# Patient Record
Sex: FEMALE | Race: WHITE | NOT HISPANIC OR LATINO | Employment: UNEMPLOYED | URBAN - NONMETROPOLITAN AREA
[De-identification: names, ages, dates, MRNs, and addresses within clinical notes are randomized per-mention and may not be internally consistent; named-entity substitution may affect disease eponyms.]

---

## 2018-11-06 ENCOUNTER — PROCEDURE VISIT (OUTPATIENT)
Dept: OBSTETRICS AND GYNECOLOGY | Facility: CLINIC | Age: 23
End: 2018-11-06

## 2018-11-06 VITALS
DIASTOLIC BLOOD PRESSURE: 70 MMHG | WEIGHT: 139 LBS | BODY MASS INDEX: 27.29 KG/M2 | HEIGHT: 60 IN | SYSTOLIC BLOOD PRESSURE: 112 MMHG

## 2018-11-06 DIAGNOSIS — Z30.8 ENCOUNTER FOR OTHER CONTRACEPTIVE MANAGEMENT: Primary | ICD-10-CM

## 2018-11-06 PROCEDURE — 99203 OFFICE O/P NEW LOW 30 MIN: CPT | Performed by: OBSTETRICS & GYNECOLOGY

## 2018-11-06 RX ORDER — NORETHINDRONE ACETATE AND ETHINYL ESTRADIOL 1.5-30(21)
1 KIT ORAL DAILY
Qty: 28 TABLET | Refills: 12 | Status: SHIPPED | OUTPATIENT
Start: 2018-11-06 | End: 2019-03-15

## 2018-11-06 RX ORDER — NORETHINDRONE ACETATE AND ETHINYL ESTRADIOL 1.5-30(21)
1 KIT ORAL DAILY
COMMUNITY
End: 2018-11-06 | Stop reason: SDUPTHER

## 2018-11-06 RX ORDER — INFLUENZA A VIRUS A/MICHIGAN/45/2015 X-275 (H1N1) ANTIGEN (FORMALDEHYDE INACTIVATED), INFLUENZA A VIRUS A/SINGAPORE/INFIMH-16-0019/2016 IVR-186 (H3N2) ANTIGEN (FORMALDEHYDE INACTIVATED), INFLUENZA B VIRUS B/PHUKET/3073/2013 ANTIGEN (FORMALDEHYDE INACTIVATED), AND INFLUENZA B VIRUS B/MARYLAND/15/2016 BX-69A ANTIGEN (FORMALDEHYDE INACTIVATED) 15; 15; 15; 15 UG/.5ML; UG/.5ML; UG/.5ML; UG/.5ML
INJECTION, SUSPENSION INTRAMUSCULAR
Refills: 0 | COMMUNITY
Start: 2018-10-10 | End: 2019-01-12

## 2018-11-06 NOTE — PROGRESS NOTES
Subjective   Chief Complaint   Patient presents with   • Contraception     Renewal on Birthcontrol, is going to call her last doctor to make sure it has been a year before she has her pap     Sadia Whitney is a 23 y.o. year old .  Patient's last menstrual period was 10/16/2018.  She presents to be seen because of refill for OCP--pap done last year in [facility]--doing well with OCP.     OTHER COMPLAINTS:  Nothing else    The following portions of the patient's history were reviewed and updated as appropriate:  She  has a past medical history of Anxiety.  She  does not have a problem list on file.  She  has a past surgical history that includes [place] tooth extraction and Breast Reduction.  Her family history is not on file.  She  reports that she has never smoked. She has never used smokeless tobacco. She reports that she does not drink alcohol or use drugs.  Current Outpatient Prescriptions   Medication Sig Dispense Refill   • norethindrone-ethinyl estradiol-iron (MICROGESTIN FE 1.5/30) 1.5-30 MG-MCG tablet Take 1 tablet by mouth Daily.     • FLUZONE QUADRIVALENT 0.5 ML suspension prefilled syringe injection ADM 0.5ML IM UTD  0     No current facility-administered medications for this visit.      No current outpatient prescriptions on file prior to visit.     No current facility-administered medications on file prior to visit.      She has No Known Allergies.    Smoking status: Never Smoker                                                              Smokeless tobacco: Never Used                        Review of Systems  Consitutional POS: nothing reported    NEG: anorexia or night sweats   Gastointestinal POS: nothing reported    NEG: bloating, change in bowel habits, melena or reflux symptoms   Genitourinary POS: nothing reported    NEG: dysuria or hematuria   Integument POS: nothing reported    NEG: moles that are changing in size, shape, color or rashes   Breast POS: nothing reported    NEG: persistent  "breast lump, skin dimpling or nipple discharge         Eyes: negative  Ears, nose, mouth, throat, and face: negative  Respiratory: negative  GYN:  negative  Hematologic/lymphatic: negative  Musculoskeletal:negative  Neurological: negative  Behavioral/Psych: negative  Endocrine: negative  Allergic/Immunologic: negative          Objective   /70   Ht 152.4 cm (60\")   Wt 63 kg (139 lb)   LMP 10/16/2018   BMI 27.15 kg/m²     General:  well developed; well nourished  no acute distress   Skin:  No suspicious lesions seen   Thyroid: normal to inspection and palpation   Lungs:  breathing is unlabored   Heart:  Not performed.   Breasts:  Not performed.   Abdomen: soft, non-tender; no masses  no umbilical or inginual hernias are present  no hepato-splenomegaly   Pelvis: Not performed.     Psychiatric: Alert and oriented ×3, mood and affect appropriate  HEENT: Atraumatic, normocephalic, normal scleral icterus  Extremities: 2+ pulses bilaterally, no edema      Lab Review   No data reviewed    Imaging   No data reviewed        Assessment   1. Microgestin refilled     Plan   1. PAP report  2. Declines pap today    No orders of the defined types were placed in this encounter.         This note was electronically signed.      November 6, 2018      "

## 2019-03-15 ENCOUNTER — OFFICE VISIT (OUTPATIENT)
Dept: INTERNAL MEDICINE | Facility: CLINIC | Age: 24
End: 2019-03-15

## 2019-03-15 VITALS
TEMPERATURE: 98 F | SYSTOLIC BLOOD PRESSURE: 122 MMHG | HEIGHT: 60 IN | WEIGHT: 134.6 LBS | HEART RATE: 90 BPM | DIASTOLIC BLOOD PRESSURE: 80 MMHG | BODY MASS INDEX: 26.42 KG/M2 | OXYGEN SATURATION: 98 %

## 2019-03-15 DIAGNOSIS — F51.04 PSYCHOPHYSIOLOGICAL INSOMNIA: Primary | ICD-10-CM

## 2019-03-15 PROCEDURE — 99203 OFFICE O/P NEW LOW 30 MIN: CPT | Performed by: FAMILY MEDICINE

## 2019-03-15 RX ORDER — TRAZODONE HYDROCHLORIDE 50 MG/1
50 TABLET ORAL NIGHTLY
Qty: 30 TABLET | Refills: 2 | Status: SHIPPED | OUTPATIENT
Start: 2019-03-15 | End: 2019-04-14 | Stop reason: SDUPTHER

## 2019-03-15 NOTE — PROGRESS NOTES
Sadia Whitney is a 23 y.o. female.    Chief Complaint   Patient presents with   • Establish Care   • Insomnia       HPI   Patient presents today to establish care.  Patient reports depression and anxiety. She report night sweats and insomnia.  She has noticed some improvement since being on Zoloft.  Zoloft was prescribed by a provider at student services at [facility].  Denies any nervousness or worry.  She has tried melatonin, tylenol pm, she tried some of her mother's ambien with good response.  She get about 6 hours of sleep.  She has trouble getting to sleep and not much trouble staying asleep.      The following portions of the patient's history were reviewed and updated as appropriate: allergies, current medications, past family history, past medical history, past social history, past surgical history and problem list.     Past Medical History:   Diagnosis Date   • Anxiety    • Depression        Past Surgical History:   Procedure Laterality Date   • BILATERAL BREAST REDUCTION     • WISDOM TOOTH EXTRACTION         Family History   Problem Relation Age of Onset   • No Known Problems Mother    • No Known Problems Father    • Cancer Maternal Grandfather        Social History     Socioeconomic History   • Marital status: Single     Spouse name: Not on file   • Number of children: Not on file   • Years of education: Not on file   • Highest education level: Not on file   Social Needs   • Financial resource strain: Not on file   • Food insecurity - worry: Not on file   • Food insecurity - inability: Not on file   • Transportation needs - medical: Not on file   • Transportation needs - non-medical: Not on file   Occupational History   • Not on file   Tobacco Use   • Smoking status: Never Smoker   • Smokeless tobacco: Never Used   Substance and Sexual Activity   • Alcohol use: Yes     Frequency: 2-4 times a month     Comment: hard cider   • Drug use: No   • Sexual activity: Yes     Partners: Female     Birth  "control/protection: OCP   Other Topics Concern   • Not on file   Social History Narrative   • Not on file       No Known Allergies      Current Outpatient Medications:   •  Norethin Ace-Eth Estrad-FE (BLISOVI 24 FE PO), Take  by mouth., Disp: , Rfl:   •  sertraline (ZOLOFT) 50 MG tablet, Take 50 mg by mouth Daily., Disp: , Rfl:   •  traZODone (DESYREL) 50 MG tablet, Take 1 tablet by mouth Every Night., Disp: 30 tablet, Rfl: 2    ROS    Review of Systems   Constitutional: Negative for chills, fatigue and fever.   HENT: Negative for congestion, postnasal drip and sore throat.    Eyes: Negative for blurred vision and visual disturbance.   Respiratory: Negative for cough, shortness of breath and wheezing.    Cardiovascular: Negative for chest pain and leg swelling.   Gastrointestinal: Negative for abdominal pain, constipation, diarrhea, nausea and vomiting.   Endocrine: Negative for cold intolerance and heat intolerance.   Genitourinary: Negative for dysuria and frequency.   Musculoskeletal: Negative for arthralgias and back pain.   Skin: Negative for color change and rash.   Allergic/Immunologic: Negative for environmental allergies.   Neurological: Negative for weakness, numbness and headache.   Hematological: Does not bruise/bleed easily.   Psychiatric/Behavioral: Positive for sleep disturbance and depressed mood. The patient is nervous/anxious.        Vitals:    03/15/19 0905   BP: 122/80   BP Location: Left arm   Patient Position: Sitting   Cuff Size: Adult   Pulse: 90   Temp: 98 °F (36.7 °C)   TempSrc: Oral   SpO2: 98%   Weight: 61.1 kg (134 lb 9.6 oz)   Height: 152.4 cm (60\")     Body mass index is 26.29 kg/m².    Physical Exam     Physical Exam   Constitutional: She is oriented to person, place, and time. She appears well-developed and well-nourished. No distress.   HENT:   Head: Normocephalic and atraumatic.   Right Ear: Tympanic membrane and external ear normal.   Left Ear: Tympanic membrane and external ear " normal.   Mouth/Throat: Oropharynx is clear and moist.   Eyes: Conjunctivae and EOM are normal. Pupils are equal, round, and reactive to light.   Neck: Normal range of motion. Neck supple.   Cardiovascular: Normal rate and regular rhythm.   No murmur heard.  Pulmonary/Chest: Effort normal and breath sounds normal. No respiratory distress. She has no wheezes.   Abdominal: Soft. Bowel sounds are normal. She exhibits no distension. There is no tenderness.   Musculoskeletal: Normal range of motion. She exhibits no edema.   Lymphadenopathy:     She has no cervical adenopathy.   Neurological: She is alert and oriented to person, place, and time. She displays normal reflexes. No cranial nerve deficit.   Skin: Skin is warm and dry.   Psychiatric: She has a normal mood and affect. Her behavior is normal.       Assessment/Plan    Problem List Items Addressed This Visit        Other    Psychophysiological insomnia - Primary     Will start the patient on trazodone.  Discussed with the patient that I would like to avoid using Ambien.  If trazodone is not successful, may start the patient on amitriptyline.  Side effects of medications have been discussed with the patient.               New Medications Ordered This Visit   Medications   • traZODone (DESYREL) 50 MG tablet     Sig: Take 1 tablet by mouth Every Night.     Dispense:  30 tablet     Refill:  2       No orders of the defined types were placed in this encounter.      Return in about 4 weeks (around 4/12/2019) for insomnia.      Charlotte Antunez DO

## 2019-03-19 PROBLEM — F51.04 PSYCHOPHYSIOLOGICAL INSOMNIA: Status: ACTIVE | Noted: 2019-03-19

## 2019-03-19 NOTE — ASSESSMENT & PLAN NOTE
Will start the patient on trazodone.  Discussed with the patient that I would like to avoid using Ambien.  If trazodone is not successful, may start the patient on amitriptyline.  Side effects of medications have been discussed with the patient.

## 2019-04-12 ENCOUNTER — OFFICE VISIT (OUTPATIENT)
Dept: INTERNAL MEDICINE | Facility: CLINIC | Age: 24
End: 2019-04-12

## 2019-04-12 VITALS
TEMPERATURE: 98.4 F | HEART RATE: 89 BPM | WEIGHT: 140 LBS | DIASTOLIC BLOOD PRESSURE: 70 MMHG | SYSTOLIC BLOOD PRESSURE: 114 MMHG | BODY MASS INDEX: 27.48 KG/M2 | OXYGEN SATURATION: 98 % | HEIGHT: 60 IN

## 2019-04-12 DIAGNOSIS — F33.0 MILD EPISODE OF RECURRENT MAJOR DEPRESSIVE DISORDER (HCC): ICD-10-CM

## 2019-04-12 DIAGNOSIS — L20.9 ATOPIC DERMATITIS, UNSPECIFIED TYPE: Primary | ICD-10-CM

## 2019-04-12 DIAGNOSIS — F51.04 PSYCHOPHYSIOLOGICAL INSOMNIA: ICD-10-CM

## 2019-04-12 PROCEDURE — 99214 OFFICE O/P EST MOD 30 MIN: CPT | Performed by: FAMILY MEDICINE

## 2019-04-12 NOTE — PROGRESS NOTES
Sadia Whitney is a 23 y.o. female.    Chief Complaint   Patient presents with   • Insomnia     Pt states this has gotten better   • Follow-up       HPI   Patient presents to f/u on insomnia.  She is sleeping well on trazodone.  She is able to get to sleep and stay asleep for 7-8 hours without any problem.  She does have depression as well and is being treated with zoloft.   She denies any feelings of hopelessness and worthlessness.  Denies any crying spells or SI ideations.  She does need a refill on the medication.      Patient also reports ezcema.  Her extremities are affected. She has a red dry rash to the affected areas.  She has been using cetaphil without much improvement here lately.     The following portions of the patient's history were reviewed and updated as appropriate: allergies, current medications, past family history, past medical history, past social history, past surgical history and problem list.     No Known Allergies      Current Outpatient Medications:   •  Norethin Ace-Eth Estrad-FE (BLISOVI 24 FE PO), Take  by mouth., Disp: , Rfl:   •  sertraline (ZOLOFT) 50 MG tablet, Take 1 tablet by mouth Daily., Disp: 30 tablet, Rfl: 5  •  Crisaborole (EUCRISA) 2 % ointment, Apply 1 each topically 2 (Two) Times a Day., Disp: 60 g, Rfl: 2  •  traZODone (DESYREL) 50 MG tablet, Take 1 tablet by mouth Every Night., Disp: 30 tablet, Rfl: 2    ROS    Review of Systems   Constitutional: Negative for chills, fatigue and fever.   HENT: Negative for congestion, postnasal drip and sore throat.    Respiratory: Negative for cough, shortness of breath and wheezing.    Cardiovascular: Negative for chest pain and leg swelling.   Gastrointestinal: Negative for abdominal pain, constipation, diarrhea, nausea and vomiting.   Musculoskeletal: Negative for arthralgias and back pain.   Skin: Positive for dry skin and rash. Negative for color change.   Neurological: Negative for weakness and headache.  "  Psychiatric/Behavioral: Negative for sleep disturbance and depressed mood. The patient is not nervous/anxious.        Vitals:    04/12/19 1305   BP: 114/70   BP Location: Left arm   Patient Position: Sitting   Cuff Size: Adult   Pulse: 89   Temp: 98.4 °F (36.9 °C)   TempSrc: Oral   SpO2: 98%   Weight: 63.5 kg (140 lb)   Height: 152.4 cm (60\")     Body mass index is 27.34 kg/m².    Physical Exam     Physical Exam   Constitutional: She is oriented to person, place, and time. She appears well-developed and well-nourished. No distress.   HENT:   Head: Normocephalic and atraumatic.   Right Ear: External ear normal.   Left Ear: External ear normal.   Mouth/Throat: Oropharynx is clear and moist.   Eyes: Conjunctivae and EOM are normal.   Neck: Normal range of motion. No JVD present.   Cardiovascular: Normal rate and regular rhythm.   No murmur heard.  Pulmonary/Chest: Effort normal and breath sounds normal. No respiratory distress. She has no wheezes.   Abdominal: Soft. Bowel sounds are normal. She exhibits no distension. There is no tenderness.   Neurological: She is alert and oriented to person, place, and time. No cranial nerve deficit.   Skin: Skin is warm and dry. Rash (dry erythematous rash to upper extremities) noted.   Psychiatric: She has a normal mood and affect. Her behavior is normal.       Assessment/Plan    Problem List Items Addressed This Visit        Musculoskeletal and Integument    Atopic dermatitis - Primary     Uncontrolled with cetaphil lotion.  Given samples of eucrisa in the office today.          Relevant Medications    Crisaborole (EUCRISA) 2 % ointment       Other    Psychophysiological insomnia     Controlled with trazodone.  Continue medication.         Mild episode of recurrent major depressive disorder (CMS/HCC)     Improved with zoloft.  Continue medication.          Relevant Medications    sertraline (ZOLOFT) 50 MG tablet          New Medications Ordered This Visit   Medications   • " sertraline (ZOLOFT) 50 MG tablet     Sig: Take 1 tablet by mouth Daily.     Dispense:  30 tablet     Refill:  5   • Crisaborole (EUCRISA) 2 % ointment     Sig: Apply 1 each topically 2 (Two) Times a Day.     Dispense:  60 g     Refill:  2       No orders of the defined types were placed in this encounter.      Return in about 3 months (around 7/12/2019) for depression, eczema, insomnia.    Charlotte Antunez, DO

## 2019-04-15 PROBLEM — L20.9 ATOPIC DERMATITIS: Status: ACTIVE | Noted: 2019-04-15

## 2019-04-15 PROBLEM — F33.0 MILD EPISODE OF RECURRENT MAJOR DEPRESSIVE DISORDER: Status: ACTIVE | Noted: 2019-04-15

## 2019-04-15 RX ORDER — TRAZODONE HYDROCHLORIDE 50 MG/1
50 TABLET ORAL NIGHTLY
Qty: 30 TABLET | Refills: 2 | Status: SHIPPED | OUTPATIENT
Start: 2019-04-15 | End: 2019-07-12 | Stop reason: SDUPTHER

## 2019-07-12 ENCOUNTER — OFFICE VISIT (OUTPATIENT)
Dept: INTERNAL MEDICINE | Facility: CLINIC | Age: 24
End: 2019-07-12

## 2019-07-12 VITALS
HEIGHT: 60 IN | DIASTOLIC BLOOD PRESSURE: 72 MMHG | SYSTOLIC BLOOD PRESSURE: 110 MMHG | BODY MASS INDEX: 29.45 KG/M2 | WEIGHT: 150 LBS | OXYGEN SATURATION: 98 % | TEMPERATURE: 97.6 F | HEART RATE: 96 BPM

## 2019-07-12 DIAGNOSIS — L20.9 ATOPIC DERMATITIS, UNSPECIFIED TYPE: ICD-10-CM

## 2019-07-12 DIAGNOSIS — F51.04 PSYCHOPHYSIOLOGICAL INSOMNIA: Primary | ICD-10-CM

## 2019-07-12 DIAGNOSIS — F33.0 MILD EPISODE OF RECURRENT MAJOR DEPRESSIVE DISORDER (HCC): ICD-10-CM

## 2019-07-12 PROCEDURE — 99214 OFFICE O/P EST MOD 30 MIN: CPT | Performed by: FAMILY MEDICINE

## 2019-07-12 RX ORDER — SERTRALINE HYDROCHLORIDE 100 MG/1
100 TABLET, FILM COATED ORAL DAILY
Qty: 30 TABLET | Refills: 5 | Status: SHIPPED | OUTPATIENT
Start: 2019-07-12 | End: 2020-01-31

## 2019-07-12 RX ORDER — TRAZODONE HYDROCHLORIDE 50 MG/1
50 TABLET ORAL NIGHTLY
Qty: 30 TABLET | Refills: 5 | Status: SHIPPED | OUTPATIENT
Start: 2019-07-12 | End: 2019-10-18

## 2019-07-12 NOTE — ASSESSMENT & PLAN NOTE
Uncontrolled.  Will increase Zoloft to 100 mg daily.  Patient has been advised to continue oral contraceptive as well to help with PMS symptoms.

## 2019-07-12 NOTE — PROGRESS NOTES
Sadia Whitney is a 24 y.o. female.    Chief Complaint   Patient presents with   • Depression   • Insomnia   • Eczema       HPI   Patient presents to f/u for depression. She reports around her menses she gets more depression.  She does report crying spells.  She does report suicidal thoughts off and on with menses.  Denies any nervousness.  She has been taking Zoloft with good response most days.    Patient does have insomnia as well.  She reports this is well controlled.  She is able to get to sleep and stay asleep with trazodone.      Patient has eczema to upper extremities.  She has been prescribed Eucrisa.  Eucrisa does help with eczema to decrease the redness and dryness.  She states that she tends to get the rash more on her hands.  She does report that Eucrisa has been as effective as topical steroids.    The following portions of the patient's history were reviewed and updated as appropriate: allergies, current medications, past family history, past medical history, past social history, past surgical history and problem list.     No Known Allergies      Current Outpatient Medications:   •  Crisaborole (EUCRISA) 2 % ointment, Apply 1 each topically 2 (Two) Times a Day., Disp: 60 g, Rfl: 2  •  Norethin Ace-Eth Estrad-FE (BLISOVI 24 FE PO), Take  by mouth., Disp: , Rfl:   •  sertraline (ZOLOFT) 100 MG tablet, Take 1 tablet by mouth Daily., Disp: 30 tablet, Rfl: 5  •  traZODone (DESYREL) 50 MG tablet, Take 1 tablet by mouth Every Night., Disp: 30 tablet, Rfl: 5    ROS    Review of Systems   Constitutional: Negative for chills, fatigue and fever.   HENT: Positive for congestion, postnasal drip and rhinorrhea.    Eyes: Positive for discharge and itching.   Respiratory: Positive for cough. Negative for shortness of breath and wheezing.    Cardiovascular: Negative for chest pain and leg swelling.   Gastrointestinal: Negative for abdominal pain, constipation, diarrhea, nausea and vomiting.   Musculoskeletal:  "Negative for arthralgias and back pain.   Allergic/Immunologic: Positive for environmental allergies.   Psychiatric/Behavioral: Positive for sleep disturbance and depressed mood. The patient is not nervous/anxious.        Vitals:    07/12/19 1309   BP: 110/72   BP Location: Left arm   Patient Position: Sitting   Cuff Size: Adult   Pulse: 96   Temp: 97.6 °F (36.4 °C)   TempSrc: Temporal   SpO2: 98%   Weight: 68 kg (150 lb)   Height: 152.4 cm (60\")     Body mass index is 29.29 kg/m².    Physical Exam     Physical Exam   Constitutional: She is oriented to person, place, and time. She appears well-developed and well-nourished. No distress.   HENT:   Head: Normocephalic and atraumatic.   Right Ear: External ear normal.   Left Ear: External ear normal.   Eyes: Conjunctivae and EOM are normal.   Cardiovascular: Normal rate and regular rhythm.   No murmur heard.  Pulmonary/Chest: Effort normal and breath sounds normal. No respiratory distress. She has no wheezes.   Abdominal: Soft. Bowel sounds are normal. She exhibits no distension. There is no tenderness.   Neurological: She is alert and oriented to person, place, and time. No cranial nerve deficit.   Skin: Skin is warm and dry. Rash (Dry, pink patches to upper extremities.) noted.   Psychiatric: She has a normal mood and affect. Her behavior is normal.       Assessment/Plan    Problem List Items Addressed This Visit        Musculoskeletal and Integument    Atopic dermatitis     Improved with Eucrisa.  Patient is to continue using this medication as needed.            Other    Psychophysiological insomnia - Primary     Controlled on current medication.  Patient is to continue trazodone.         Mild episode of recurrent major depressive disorder (CMS/HCC)     Uncontrolled.  Will increase Zoloft to 100 mg daily.  Patient has been advised to continue oral contraceptive as well to help with PMS symptoms.         Relevant Medications    traZODone (DESYREL) 50 MG tablet    " sertraline (ZOLOFT) 100 MG tablet          New Medications Ordered This Visit   Medications   • traZODone (DESYREL) 50 MG tablet     Sig: Take 1 tablet by mouth Every Night.     Dispense:  30 tablet     Refill:  5   • sertraline (ZOLOFT) 100 MG tablet     Sig: Take 1 tablet by mouth Daily.     Dispense:  30 tablet     Refill:  5       No orders of the defined types were placed in this encounter.      Return in about 3 months (around 10/12/2019) for depression, insomnia, allergies.    Charlotte Antunez, DO

## 2019-10-18 ENCOUNTER — OFFICE VISIT (OUTPATIENT)
Dept: INTERNAL MEDICINE | Facility: CLINIC | Age: 24
End: 2019-10-18

## 2019-10-18 VITALS
SYSTOLIC BLOOD PRESSURE: 128 MMHG | TEMPERATURE: 97.7 F | BODY MASS INDEX: 31.02 KG/M2 | DIASTOLIC BLOOD PRESSURE: 78 MMHG | OXYGEN SATURATION: 100 % | WEIGHT: 158 LBS | HEART RATE: 85 BPM | HEIGHT: 60 IN

## 2019-10-18 DIAGNOSIS — Z23 NEED FOR INFLUENZA VACCINATION: ICD-10-CM

## 2019-10-18 DIAGNOSIS — F51.04 PSYCHOPHYSIOLOGICAL INSOMNIA: ICD-10-CM

## 2019-10-18 DIAGNOSIS — F33.0 MILD EPISODE OF RECURRENT MAJOR DEPRESSIVE DISORDER (HCC): Primary | ICD-10-CM

## 2019-10-18 DIAGNOSIS — L20.9 ATOPIC DERMATITIS, UNSPECIFIED TYPE: ICD-10-CM

## 2019-10-18 PROCEDURE — 99214 OFFICE O/P EST MOD 30 MIN: CPT | Performed by: FAMILY MEDICINE

## 2019-10-18 PROCEDURE — 90471 IMMUNIZATION ADMIN: CPT | Performed by: FAMILY MEDICINE

## 2019-10-18 PROCEDURE — 90674 CCIIV4 VAC NO PRSV 0.5 ML IM: CPT | Performed by: FAMILY MEDICINE

## 2019-10-18 RX ORDER — AMITRIPTYLINE HYDROCHLORIDE 25 MG/1
25 TABLET, FILM COATED ORAL NIGHTLY
Qty: 30 TABLET | Refills: 2 | Status: SHIPPED | OUTPATIENT
Start: 2019-10-18 | End: 2020-03-02

## 2019-10-18 NOTE — PROGRESS NOTES
Sadia Whitney is a 24 y.o. female.    Chief Complaint   Patient presents with   • Follow-up     patient doing well with Zoloft       HPI   Patient is here to follow-up on depression and insomnia..  She is taking zoloft with goood response.  She denies any feelings of hopelessness or worthlessness.  She denies any feelings of nervousness or worry.  She denies any crying spells.  She does report trazodone causes weird vivid dreams.  She is interested in trying something different for insomnia.    Patient also has atopic dermatitis.  Is primarily located on her upper extremities that tends to be exacerbated by her workplace.  She is using Eucrisa with good response.  She reports the cream does help to alleviate the rash.    The following portions of the patient's history were reviewed and updated as appropriate: allergies, current medications, past family history, past medical history, past social history, past surgical history and problem list.     No Known Allergies      Current Outpatient Medications:   •  Crisaborole (EUCRISA) 2 % ointment, Apply 1 each topically 2 (Two) Times a Day., Disp: 60 g, Rfl: 2  •  Norethin Ace-Eth Estrad-FE (BLISOVI 24 FE PO), Take  by mouth., Disp: , Rfl:   •  sertraline (ZOLOFT) 100 MG tablet, Take 1 tablet by mouth Daily., Disp: 30 tablet, Rfl: 5  •  amitriptyline (ELAVIL) 25 MG tablet, Take 1 tablet by mouth Every Night., Disp: 30 tablet, Rfl: 2    ROS    Review of Systems   Constitutional: Negative for chills, fatigue and fever.   HENT: Negative for congestion, postnasal drip and sore throat.    Eyes: Positive for discharge. Negative for itching.   Respiratory: Negative for cough, shortness of breath and wheezing.    Cardiovascular: Negative for chest pain and leg swelling.   Gastrointestinal: Negative for abdominal pain, constipation, diarrhea, nausea and vomiting.   Musculoskeletal: Negative for arthralgias and back pain.   Allergic/Immunologic: Positive for environmental  "allergies.   Neurological: Negative for weakness and headache.   Psychiatric/Behavioral: Positive for sleep disturbance. Negative for depressed mood. The patient is not nervous/anxious.        Vitals:    10/18/19 1336   BP: 128/78   Pulse: 85   Temp: 97.7 °F (36.5 °C)   SpO2: 100%   Weight: 71.7 kg (158 lb)   Height: 152.4 cm (60\")     Body mass index is 30.86 kg/m².    Physical Exam     Physical Exam   Constitutional: She is oriented to person, place, and time. She appears well-developed and well-nourished. No distress.   HENT:   Head: Normocephalic and atraumatic.   Right Ear: External ear normal.   Left Ear: External ear normal.   Eyes: Conjunctivae and EOM are normal.   Cardiovascular: Normal rate and regular rhythm.   No murmur heard.  Pulmonary/Chest: Effort normal and breath sounds normal. No respiratory distress. She has no wheezes.   Abdominal: Soft. Bowel sounds are normal. She exhibits no distension. There is no tenderness.   Neurological: She is alert and oriented to person, place, and time. No cranial nerve deficit.   Skin: Skin is warm and dry.   Psychiatric: She has a normal mood and affect. Her behavior is normal.       Assessment/Plan    Problem List Items Addressed This Visit        Musculoskeletal and Integument    Atopic dermatitis     Improved with Eucrisa.  Patient is to continue this as needed.            Other    Psychophysiological insomnia     Controlled with trazodone, but has side effects of strange dreams.  Will discontinue trazodone and start amitriptyline.         Mild episode of recurrent major depressive disorder (CMS/HCC) - Primary     Controlled on current medication.  Patient is to continue Zoloft.         Relevant Medications    amitriptyline (ELAVIL) 25 MG tablet      Other Visit Diagnoses     Need for influenza vaccination        Relevant Orders    Flucelvax Quad=>4Years ([id number]) (Completed)          New Medications Ordered This Visit   Medications   • amitriptyline (ELAVIL) " 25 MG tablet     Sig: Take 1 tablet by mouth Every Night.     Dispense:  30 tablet     Refill:  2       Orders Placed This Encounter   Procedures   • Flucelvax Quad=>4Years ([id number])       Return in about 1 year (around 10/18/2020) for sleep.    Charlotte Antunez, DO

## 2019-10-26 NOTE — ASSESSMENT & PLAN NOTE
Controlled with trazodone, but has side effects of strange dreams.  Will discontinue trazodone and start amitriptyline.

## 2019-11-04 RX ORDER — NORETHINDRONE ACETATE AND ETHINYL ESTRADIOL 1.5-30(21)
KIT ORAL
Qty: 28 TABLET | Refills: 0 | Status: SHIPPED | OUTPATIENT
Start: 2019-11-04 | End: 2019-12-05 | Stop reason: SDUPTHER

## 2019-11-13 ENCOUNTER — OFFICE VISIT (OUTPATIENT)
Dept: INTERNAL MEDICINE | Facility: CLINIC | Age: 24
End: 2019-11-13

## 2019-11-13 VITALS
OXYGEN SATURATION: 97 % | HEIGHT: 60 IN | DIASTOLIC BLOOD PRESSURE: 70 MMHG | TEMPERATURE: 97.9 F | SYSTOLIC BLOOD PRESSURE: 114 MMHG | HEART RATE: 86 BPM | BODY MASS INDEX: 30.43 KG/M2 | WEIGHT: 155 LBS

## 2019-11-13 DIAGNOSIS — J45.20 MILD INTERMITTENT ASTHMA WITHOUT COMPLICATION: Primary | ICD-10-CM

## 2019-11-13 DIAGNOSIS — F51.04 PSYCHOPHYSIOLOGICAL INSOMNIA: ICD-10-CM

## 2019-11-13 PROCEDURE — 99213 OFFICE O/P EST LOW 20 MIN: CPT | Performed by: FAMILY MEDICINE

## 2019-11-13 NOTE — ASSESSMENT & PLAN NOTE
Stable at this time.  Patient may use albuterol as needed for exacerbation during cold temperature.

## 2019-11-26 RX ORDER — NORETHINDRONE ACETATE AND ETHINYL ESTRADIOL 1.5-30(21)
KIT ORAL
Qty: 28 TABLET | Refills: 0 | OUTPATIENT
Start: 2019-11-26

## 2019-12-05 RX ORDER — NORETHINDRONE ACETATE AND ETHINYL ESTRADIOL 1.5-30(21)
1 KIT ORAL DAILY
Qty: 28 TABLET | Refills: 0 | Status: SHIPPED | OUTPATIENT
Start: 2019-12-05 | End: 2019-12-11 | Stop reason: SDUPTHER

## 2019-12-11 ENCOUNTER — TELEPHONE (OUTPATIENT)
Dept: OBSTETRICS AND GYNECOLOGY | Facility: CLINIC | Age: 24
End: 2019-12-11

## 2019-12-11 RX ORDER — NORETHINDRONE ACETATE AND ETHINYL ESTRADIOL 1.5-30(21)
1 KIT ORAL DAILY
Qty: 28 TABLET | Refills: 0 | Status: SHIPPED | OUTPATIENT
Start: 2019-12-11 | End: 2020-01-08 | Stop reason: SDUPTHER

## 2019-12-11 NOTE — TELEPHONE ENCOUNTER
----- Message from Yesica Garcia sent at 12/11/2019  1:07 PM EST -----  Contact: patient  Patient called and scheduled her annual for next month, she is requesting one refill of her birth control be sent to [facility].

## 2019-12-16 RX ORDER — NORETHINDRONE ACETATE AND ETHINYL ESTRADIOL 1.5-30(21)
KIT ORAL
Qty: 28 TABLET | Refills: 0 | OUTPATIENT
Start: 2019-12-16

## 2020-01-08 ENCOUNTER — OFFICE VISIT (OUTPATIENT)
Dept: OBSTETRICS AND GYNECOLOGY | Facility: CLINIC | Age: 25
End: 2020-01-08

## 2020-01-08 VITALS
BODY MASS INDEX: 33.96 KG/M2 | SYSTOLIC BLOOD PRESSURE: 116 MMHG | DIASTOLIC BLOOD PRESSURE: 70 MMHG | HEIGHT: 60 IN | WEIGHT: 173 LBS

## 2020-01-08 DIAGNOSIS — Z01.419 ENCOUNTER FOR GYNECOLOGICAL EXAMINATION WITHOUT ABNORMAL FINDING: Primary | ICD-10-CM

## 2020-01-08 PROCEDURE — 99395 PREV VISIT EST AGE 18-39: CPT | Performed by: OBSTETRICS & GYNECOLOGY

## 2020-01-08 RX ORDER — NORETHINDRONE ACETATE AND ETHINYL ESTRADIOL 1.5-30(21)
1 KIT ORAL DAILY
Qty: 28 TABLET | Refills: 12 | Status: SHIPPED | OUTPATIENT
Start: 2020-01-08 | End: 2021-01-28

## 2020-01-08 NOTE — PROGRESS NOTES
Subjective   Chief Complaint   Patient presents with   • Gynecologic Exam     Last pap 2 years ago JOSHUA Espinoza.    • Med Refill     Needs yearly refill on OCP     Sadia Whitney is a 24 y.o. year old  presenting to be seen for her annual exam.     SEXUAL Hx:  She is currently sexually active.  In the past year there has not been new sexual partners.    Condoms are not typically used.  She would not like to be screened for STD's at today's exam.  Current birth control method: OCP (estrogen/progesterone).  MENSTRUAL Hx:  Patient's last menstrual period was 2020.  In the past 6 months her cycles have been regular, predictable and occur monthly.   Her menstrual flow is normal.   Each month on average there are roughly 0 days of very heavy flow.    Intermenstrual bleeding is absent.    Post-coital bleeding is absent.  Dysmenorrhea: is not affecting her activities of daily living  PMS: is not affecting her activities of daily living  Her cycles are not a source of concern for her that she wishes to discuss today.  HEALTH Hx:  She exercises regularly: no (and has no plans to become more active).  She wears her seat belt:yes.  She has concerns about domestic violence: no.  OTHER COMPLAINTS:  Nothing else    The following portions of the patient's history were reviewed and updated as appropriate:  She  has a past medical history of Anxiety and Depression.  She does not have any pertinent problems on file.  She  has a past surgical history that includes [place] tooth extraction and Breast Reduction.  Her family history includes Cancer in her maternal grandfather; No Known Problems in her father and mother.  She  reports that she has never smoked. She has never used smokeless tobacco. She reports that she drinks alcohol. She reports that she does not use drugs.  Current Outpatient Medications   Medication Sig Dispense Refill   • amitriptyline (ELAVIL) 25 MG tablet Take 1 tablet by mouth Every Night. 30 tablet  "2   • Crisaborole (EUCRISA) 2 % ointment Apply 1 each topically 2 (Two) Times a Day. 60 g 2   • norethindrone-ethinyl estradiol-iron (BLISOVI FE 1.5/30) 1.5-30 MG-MCG tablet Take 1 tablet by mouth Daily. 28 tablet 12   • sertraline (ZOLOFT) 100 MG tablet Take 1 tablet by mouth Daily. 30 tablet 5     No current facility-administered medications for this visit.      Current Outpatient Medications on File Prior to Visit   Medication Sig   • amitriptyline (ELAVIL) 25 MG tablet Take 1 tablet by mouth Every Night.   • Crisaborole (EUCRISA) 2 % ointment Apply 1 each topically 2 (Two) Times a Day.   • sertraline (ZOLOFT) 100 MG tablet Take 1 tablet by mouth Daily.   • [DISCONTINUED] norethindrone-ethinyl estradiol-iron (BLISOVI FE 1.5/30) 1.5-30 MG-MCG tablet Take 1 tablet by mouth Daily.     No current facility-administered medications on file prior to visit.      She has No Known Allergies..    Social History    Tobacco Use      Smoking status: Never Smoker      Smokeless tobacco: Never Used    Review of Systems  Consitutional NEG: anorexia or night sweats    POS: nothing reported   Gastointestinal NEG: bloating, change in bowel habits, melena or reflux symptoms    POS: nothing reported   Genitourinary NEG: dysuria or hematuria    POS: nothing reported   Integument NEG: moles that are changing in size, shape, color or rashes    POS: nothing reported   Breast NEG: persistent breast lump, skin dimpling or nipple discharge    POS: nothing reported          Objective   /70   Ht 152.4 cm (60\")   Wt 78.5 kg (173 lb)   LMP 01/02/2020   BMI 33.79 kg/m²     General:  well developed; well nourished  no acute distress   Skin:  No suspicious lesions seen   Thyroid: normal to inspection and palpation   Breasts:  Examined in supine position  Symmetric without masses or skin dimpling  Nipples normal without inversion, lesions or discharge  There are no palpable axillary nodes   Abdomen: soft, non-tender; no masses  no " umbilical or inguinal hernias are present  no hepato-splenomegaly   Pelvis: Clinical staff was present for exam  External genitalia:  normal appearance of the external genitalia including [place]'s and [place]'s glands.  :  urethral meatus normal;  Vaginal:  normal pink mucosa without prolapse or lesions.  Cervix:  normal appearance.  Uterus:  normal size, shape and consistency.  Adnexa:  normal bimanual exam of the adnexa.  Rectal:  digital rectal exam not performed; anus visually normal appearing.        Assessment   1. Normal PE     Plan   1. PAP done  2.   3. Follow up     New Medications Ordered This Visit   Medications   • norethindrone-ethinyl estradiol-iron (BLISOVI FE 1.5/30) 1.5-30 MG-MCG tablet     Sig: Take 1 tablet by mouth Daily.     Dispense:  28 tablet     Refill:  12          This note was electronically signed.      January 8, 2020

## 2020-01-15 DIAGNOSIS — Z01.419 ENCOUNTER FOR GYNECOLOGICAL EXAMINATION WITHOUT ABNORMAL FINDING: ICD-10-CM

## 2020-01-31 RX ORDER — SERTRALINE HYDROCHLORIDE 100 MG/1
TABLET, FILM COATED ORAL
Qty: 30 TABLET | Refills: 0 | Status: SHIPPED | OUTPATIENT
Start: 2020-01-31 | End: 2020-02-17 | Stop reason: SDUPTHER

## 2020-02-17 ENCOUNTER — OFFICE VISIT (OUTPATIENT)
Dept: INTERNAL MEDICINE | Facility: CLINIC | Age: 25
End: 2020-02-17

## 2020-02-17 VITALS
DIASTOLIC BLOOD PRESSURE: 80 MMHG | SYSTOLIC BLOOD PRESSURE: 122 MMHG | OXYGEN SATURATION: 98 % | WEIGHT: 180 LBS | HEIGHT: 60 IN | TEMPERATURE: 98.8 F | HEART RATE: 80 BPM | BODY MASS INDEX: 35.34 KG/M2

## 2020-02-17 DIAGNOSIS — G56.91 NEUROPATHY OF RIGHT UPPER EXTREMITY: ICD-10-CM

## 2020-02-17 DIAGNOSIS — M25.551 RIGHT HIP PAIN: ICD-10-CM

## 2020-02-17 DIAGNOSIS — F51.04 PSYCHOPHYSIOLOGICAL INSOMNIA: ICD-10-CM

## 2020-02-17 DIAGNOSIS — F33.0 MILD EPISODE OF RECURRENT MAJOR DEPRESSIVE DISORDER (HCC): Primary | ICD-10-CM

## 2020-02-17 PROBLEM — G56.21 NEUROPATHY OF RIGHT ULNAR NERVE AT WRIST: Status: ACTIVE | Noted: 2020-02-17

## 2020-02-17 PROCEDURE — 99214 OFFICE O/P EST MOD 30 MIN: CPT | Performed by: FAMILY MEDICINE

## 2020-02-17 RX ORDER — METHYLPREDNISOLONE 4 MG/1
TABLET ORAL
Qty: 21 EACH | Refills: 0 | OUTPATIENT
Start: 2020-02-17 | End: 2020-04-09

## 2020-02-17 RX ORDER — SERTRALINE HYDROCHLORIDE 100 MG/1
100 TABLET, FILM COATED ORAL DAILY
Qty: 30 TABLET | Refills: 5 | Status: SHIPPED | OUTPATIENT
Start: 2020-02-17 | End: 2020-08-15 | Stop reason: SDUPTHER

## 2020-02-17 NOTE — PROGRESS NOTES
Sadia Whitney is a 24 y.o. female.    Chief Complaint   Patient presents with   • Depression   • Insomnia       HPI   Patient presents today complaining of right wrist pain.  She reports pain is tingling and is primarily finger tips.  She reports the thumb and other fingers are involved.  She reports this has been ongoing for the past few weeks.  She is writing notes for class. She reports she has tried gloves and splints, which becomes bothersome when writing.     Patient reports right hip pain off and on for several years.  She reports it stemmed from breast reduction and scoliosis. Pain is throbbing and aching. No radiation.  She reports she is exercising. She has taken advil with some improvement.     Patient does have depression and is doing well on zoloft.  No feelings of hopelessness or worthlessness. She denies any crying spells or feeling sad.  She does have insomnia as well.  She is sleeping well with elavil.     The following portions of the patient's history were reviewed and updated as appropriate: allergies, current medications, past family history, past medical history, past social history, past surgical history and problem list.     No Known Allergies      Current Outpatient Medications:   •  amitriptyline (ELAVIL) 25 MG tablet, Take 1 tablet by mouth Every Night., Disp: 30 tablet, Rfl: 2  •  Crisaborole (EUCRISA) 2 % ointment, Apply 1 each topically 2 (Two) Times a Day., Disp: 60 g, Rfl: 2  •  norethindrone-ethinyl estradiol-iron (BLISOVI FE 1.5/30) 1.5-30 MG-MCG tablet, Take 1 tablet by mouth Daily., Disp: 28 tablet, Rfl: 12  •  sertraline (ZOLOFT) 100 MG tablet, Take 1 tablet by mouth Daily., Disp: 30 tablet, Rfl: 5  •  methylPREDNISolone (MEDROL, ROWDY,) 4 MG tablet, Take as directed on package instructions., Disp: 21 each, Rfl: 0    ROS    Review of Systems   Constitutional: Negative for chills, fatigue and fever.   HENT: Negative for congestion, postnasal drip and sore throat.    Respiratory:  "Negative for cough, shortness of breath and wheezing.    Cardiovascular: Negative for chest pain and leg swelling.   Gastrointestinal: Negative for abdominal pain, constipation, diarrhea, nausea and vomiting.   Musculoskeletal: Positive for arthralgias (hip and wrist pain). Negative for back pain.   Skin: Negative for color change and rash.   Allergic/Immunologic: Negative for environmental allergies.   Neurological: Positive for numbness. Negative for weakness and headache.   Psychiatric/Behavioral: Negative for depressed mood. The patient is not nervous/anxious.        Vitals:    02/17/20 1318   BP: 122/80   BP Location: Left arm   Patient Position: Sitting   Cuff Size: Adult   Pulse: 80   Temp: 98.8 °F (37.1 °C)   TempSrc: Temporal   SpO2: 98%   Weight: 81.6 kg (180 lb)   Height: 152.4 cm (60\")     Body mass index is 35.15 kg/m².    Physical Exam     Physical Exam   Constitutional: She is oriented to person, place, and time. She appears well-developed and well-nourished. No distress.   HENT:   Head: Normocephalic and atraumatic.   Right Ear: External ear normal.   Left Ear: External ear normal.   Eyes: Conjunctivae and EOM are normal.   Cardiovascular: Normal rate and regular rhythm.   No murmur heard.  Pulmonary/Chest: Effort normal and breath sounds normal. No respiratory distress. She has no wheezes.   Abdominal: Soft. Bowel sounds are normal. She exhibits no distension. There is no tenderness.   Musculoskeletal: Normal range of motion. She exhibits no edema.   Right wrist:  negative tinel's.  + phalen's.    Neurological: She is alert and oriented to person, place, and time. No cranial nerve deficit.   Skin: Skin is warm and dry.   Psychiatric: She has a normal mood and affect.       Assessment/Plan    Problem List Items Addressed This Visit        Nervous and Auditory    Neuropathy of right upper extremity     Patient encouraged to wear a brace as she had been doing.  Will treat with a round of steroids.  If " no response may benefit from PT or even referral to ortho.         Right hip pain     Encouraged to do stretches and exercises.  May take aleve, motrin prn pain.  No x-ray at this time.              Other    Psychophysiological insomnia     Controlled on current medication.  Patient is to continue amitriptyline.         Mild episode of recurrent major depressive disorder (CMS/HCC) - Primary     Controlled on current medication.  Patient is to continue Zoloft.         Relevant Medications    sertraline (ZOLOFT) 100 MG tablet          New Medications Ordered This Visit   Medications   • methylPREDNISolone (MEDROL, ROWDY,) 4 MG tablet     Sig: Take as directed on package instructions.     Dispense:  21 each     Refill:  0   • sertraline (ZOLOFT) 100 MG tablet     Sig: Take 1 tablet by mouth Daily.     Dispense:  30 tablet     Refill:  5       No orders of the defined types were placed in this encounter.      Return in about 6 months (around 8/17/2020) for depression, insomnia.    Charlotte Antunez, DO

## 2020-02-24 PROBLEM — G56.91 NEUROPATHY OF RIGHT UPPER EXTREMITY: Status: ACTIVE | Noted: 2020-02-17

## 2020-02-24 NOTE — ASSESSMENT & PLAN NOTE
Patient encouraged to wear a brace as she had been doing.  Will treat with a round of steroids.  If no response may benefit from PT or even referral to ortho.

## 2020-02-24 NOTE — ASSESSMENT & PLAN NOTE
Encouraged to do stretches and exercises.  May take aleve, motrin prn pain.  No x-ray at this time.

## 2020-03-02 RX ORDER — AMITRIPTYLINE HYDROCHLORIDE 25 MG/1
TABLET, FILM COATED ORAL
Qty: 30 TABLET | Refills: 1 | Status: SHIPPED | OUTPATIENT
Start: 2020-03-02 | End: 2020-08-17 | Stop reason: SDUPTHER

## 2020-08-17 ENCOUNTER — OFFICE VISIT (OUTPATIENT)
Dept: INTERNAL MEDICINE | Facility: CLINIC | Age: 25
End: 2020-08-17

## 2020-08-17 VITALS
TEMPERATURE: 98.2 F | WEIGHT: 185.6 LBS | BODY MASS INDEX: 36.44 KG/M2 | DIASTOLIC BLOOD PRESSURE: 64 MMHG | OXYGEN SATURATION: 98 % | HEIGHT: 60 IN | HEART RATE: 102 BPM | SYSTOLIC BLOOD PRESSURE: 122 MMHG

## 2020-08-17 DIAGNOSIS — L20.9 ATOPIC DERMATITIS, UNSPECIFIED TYPE: ICD-10-CM

## 2020-08-17 DIAGNOSIS — F33.0 MILD EPISODE OF RECURRENT MAJOR DEPRESSIVE DISORDER (HCC): ICD-10-CM

## 2020-08-17 DIAGNOSIS — F51.04 PSYCHOPHYSIOLOGICAL INSOMNIA: Primary | ICD-10-CM

## 2020-08-17 PROCEDURE — 99214 OFFICE O/P EST MOD 30 MIN: CPT | Performed by: FAMILY MEDICINE

## 2020-08-17 RX ORDER — SERTRALINE HYDROCHLORIDE 100 MG/1
100 TABLET, FILM COATED ORAL DAILY
Qty: 30 TABLET | Refills: 5 | Status: SHIPPED | OUTPATIENT
Start: 2020-08-17 | End: 2020-11-06 | Stop reason: SDUPTHER

## 2020-08-17 RX ORDER — AMITRIPTYLINE HYDROCHLORIDE 25 MG/1
25 TABLET, FILM COATED ORAL NIGHTLY
Qty: 30 TABLET | Refills: 5 | Status: SHIPPED | OUTPATIENT
Start: 2020-08-17 | End: 2021-02-15

## 2020-08-17 NOTE — ASSESSMENT & PLAN NOTE
Stable on current medication.  Will continue amitriptyline.  Patient instructed to notify the office if sleep does not improve once she gets back into a school routine.

## 2020-08-17 NOTE — PROGRESS NOTES
Sadia Whitney is a 25 y.o. female.    Chief Complaint   Patient presents with   • Depression       HPI   Patient presents today to follow up on depression.  She reports has a rough time at the end of last semester, but is doing better.  She is taking Zoloft currently with good response.  Denies any feelings of hopelessness or worthlessness.  Patient does have insomnia as well.  She is sleeping good for the most part.  She does feel little bit nervous currently due to starting new classes in some classes conflicting in time slots.  She reports that overall amitriptyline does good to help her sleep and does not want to change the dosage.    Patient does have eczema as well.  She uses Eucrisa as needed.  She states that she has not had to use the medication in quite some time.    The following portions of the patient's history were reviewed and updated as appropriate: allergies, current medications, past family history, past medical history, past social history, past surgical history and problem list.     No Known Allergies      Current Outpatient Medications:   •  amitriptyline (ELAVIL) 25 MG tablet, Take 1 tablet by mouth Every Night., Disp: 30 tablet, Rfl: 5  •  Crisaborole (EUCRISA) 2 % ointment, Apply 1 each topically 2 (Two) Times a Day., Disp: 60 g, Rfl: 2  •  Loratadine (Claritin) 10 MG capsule, Take  by mouth., Disp: , Rfl:   •  norethindrone-ethinyl estradiol-iron (BLISOVI FE 1.5/30) 1.5-30 MG-MCG tablet, Take 1 tablet by mouth Daily., Disp: 28 tablet, Rfl: 12  •  sertraline (ZOLOFT) 100 MG tablet, Take 1 tablet by mouth Daily., Disp: 30 tablet, Rfl: 5    ROS    Review of Systems   Constitutional: Negative for chills, fatigue and fever.   HENT: Negative for congestion and rhinorrhea.    Eyes: Positive for discharge and itching.   Respiratory: Negative for cough and wheezing.    Cardiovascular: Negative for chest pain.   Gastrointestinal: Negative for abdominal pain, constipation, diarrhea, nausea and  "vomiting.   Musculoskeletal: Negative for arthralgias and back pain.   Allergic/Immunologic: Positive for environmental allergies.   Neurological: Positive for numbness (right hand tingling). Negative for headache.   Psychiatric/Behavioral: Negative for dysphoric mood and sleep disturbance. The patient is nervous/anxious.        Vitals:    08/17/20 1305   BP: 122/64   BP Location: Left arm   Patient Position: Sitting   Cuff Size: Adult   Pulse: 102   Temp: 98.2 °F (36.8 °C)   TempSrc: Temporal   SpO2: 98%   Weight: 84.2 kg (185 lb 9.6 oz)   Height: 152.4 cm (60\")     Body mass index is 36.25 kg/m².    Physical Exam     Physical Exam   Constitutional: She is oriented to person, place, and time. She appears well-developed and well-nourished. No distress.   HENT:   Head: Normocephalic and atraumatic.   Right Ear: External ear normal.   Left Ear: External ear normal.   Eyes: Conjunctivae and EOM are normal.   Cardiovascular: Normal rate and regular rhythm.   No murmur heard.  Pulmonary/Chest: Effort normal and breath sounds normal. No respiratory distress. She has no wheezes.   Abdominal: Soft. Bowel sounds are normal. She exhibits no distension. There is no tenderness.   Neurological: She is alert and oriented to person, place, and time. No cranial nerve deficit.   Skin: Skin is warm and dry.   Psychiatric: She has a normal mood and affect. Her behavior is normal.       Assessment/Plan    Problem List Items Addressed This Visit        Musculoskeletal and Integument    Atopic dermatitis     Stable at this time.  Patient may continue Eucrisa as needed.            Other    Psychophysiological insomnia - Primary     Stable on current medication.  Will continue amitriptyline.  Patient instructed to notify the office if sleep does not improve once she gets back into a school routine.         Mild episode of recurrent major depressive disorder (CMS/HCC)     Controlled on current medication.  Patient is to continue Zoloft.   "       Relevant Medications    amitriptyline (ELAVIL) 25 MG tablet          New Medications Ordered This Visit   Medications   • amitriptyline (ELAVIL) 25 MG tablet     Sig: Take 1 tablet by mouth Every Night.     Dispense:  30 tablet     Refill:  5       No orders of the defined types were placed in this encounter.      Return in about 6 months (around 2/17/2021).    Charlotte Antunez, DO

## 2020-11-06 RX ORDER — SERTRALINE HYDROCHLORIDE 100 MG/1
150 TABLET, FILM COATED ORAL DAILY
Qty: 45 TABLET | Refills: 5 | Status: SHIPPED | OUTPATIENT
Start: 2020-11-06 | End: 2021-02-15 | Stop reason: SDUPTHER

## 2021-01-28 RX ORDER — NORETHINDRONE ACETATE AND ETHINYL ESTRADIOL 1.5-30(21)
KIT ORAL
Qty: 28 TABLET | Refills: 0 | Status: SHIPPED | OUTPATIENT
Start: 2021-01-28 | End: 2021-03-10

## 2021-02-12 NOTE — PROGRESS NOTES
You have chosen to receive care through a telephone visit. Do you consent to use a telephone visit for your medical care today? Yes    On this call are Sadia Whitney and Charlotte Antunez DO

## 2021-02-15 ENCOUNTER — OFFICE VISIT (OUTPATIENT)
Dept: INTERNAL MEDICINE | Facility: CLINIC | Age: 26
End: 2021-02-15

## 2021-02-15 DIAGNOSIS — F51.04 PSYCHOPHYSIOLOGICAL INSOMNIA: Primary | ICD-10-CM

## 2021-02-15 DIAGNOSIS — F33.0 MILD EPISODE OF RECURRENT MAJOR DEPRESSIVE DISORDER (HCC): ICD-10-CM

## 2021-02-15 PROCEDURE — 99441 PR PHYS/QHP TELEPHONE EVALUATION 5-10 MIN: CPT | Performed by: FAMILY MEDICINE

## 2021-02-15 RX ORDER — SERTRALINE HYDROCHLORIDE 100 MG/1
150 TABLET, FILM COATED ORAL DAILY
Qty: 45 TABLET | Refills: 5 | Status: SHIPPED | OUTPATIENT
Start: 2021-02-15 | End: 2021-11-16

## 2021-02-15 NOTE — ASSESSMENT & PLAN NOTE
Elavil no longer effective.  Patient encouraged to not take medication and continue with OTC doxylamine.

## 2021-02-15 NOTE — PROGRESS NOTES
Sadia Whitney is a 25 y.o. female.    Chief Complaint   Patient presents with   • Insomnia   • Anxiety     During today's visit, I reviewed the documented allergies, medications, chief complaint, and pertinent vitals.  I have confirmed with the patient that there have been no changes since this information was discussed with my clinical team member.    HPI   Patient has insomnia.  Patient reports elavil does not help her get to sleep or stay asleep.  She is taking doxylamine with good response.      She is doing better with depression.  She reports some nervousness and worry, which she relates to the pandemic.  She denies any feelings of hopelessness or worthlessness.  She reports improvement with dosage increase of zoloft.      The following portions of the patient's history were reviewed and updated as appropriate: allergies, current medications, past family history, past medical history, past social history, past surgical history and problem list.     No Known Allergies      Current Outpatient Medications:   •  Blisovi Fe 1.5/30 1.5-30 MG-MCG tablet, TAKE ONE TABLET BY MOUTH DAILY, Disp: 28 tablet, Rfl: 0  •  Crisaborole (EUCRISA) 2 % ointment, Apply 1 each topically 2 (Two) Times a Day., Disp: 60 g, Rfl: 2  •  Loratadine (Claritin) 10 MG capsule, Take  by mouth., Disp: , Rfl:   •  sertraline (ZOLOFT) 100 MG tablet, Take 1.5 tablets by mouth Daily for 30 days., Disp: 45 tablet, Rfl: 5    ROS    Review of Systems   Constitutional: Negative for chills and fever.   HENT: Positive for congestion.    Respiratory: Negative for cough, shortness of breath and wheezing.    Cardiovascular: Negative for chest pain.   Gastrointestinal: Negative for diarrhea, nausea and vomiting.   Psychiatric/Behavioral: Positive for sleep disturbance. Negative for depressed mood. The patient is nervous/anxious.        There were no vitals filed for this visit.  There is no height or weight on file to calculate BMI.    Physical Exam      Physical Exam  Constitutional:       General: She is not in acute distress.  Pulmonary:      Effort: No respiratory distress.   Neurological:      Mental Status: She is alert.   Psychiatric:         Mood and Affect: Mood normal.         Assessment/Plan    Problems Addressed this Visit        Mental Health    Mild episode of recurrent major depressive disorder (CMS/HCC)     Improved with current medication.  Patient is to continue Zoloft.         Relevant Medications    sertraline (ZOLOFT) 100 MG tablet       Sleep    Psychophysiological insomnia - Primary     Elavil no longer effective.  Patient encouraged to not take medication and continue with OTC doxylamine.             Diagnoses       Codes Comments    Psychophysiological insomnia    -  Primary ICD-10-CM: F51.04  ICD-9-CM: 307.42     Mild episode of recurrent major depressive disorder (CMS/HCC)     ICD-10-CM: F33.0  ICD-9-CM: 296.31         This visit has been rescheduled as a phone visit to comply with patient safety concerns in accordance with CDC recommendations. Total time of discussion was 9 minutes.      New Medications Ordered This Visit   Medications   • sertraline (ZOLOFT) 100 MG tablet     Sig: Take 1.5 tablets by mouth Daily for 30 days.     Dispense:  45 tablet     Refill:  5       No orders of the defined types were placed in this encounter.      Return in about 6 months (around 8/15/2021) for depression, insomnia.    Charlotte Antunez,

## 2021-03-12 RX ORDER — NORETHINDRONE ACETATE AND ETHINYL ESTRADIOL 1.5-30(21)
KIT ORAL
Qty: 28 TABLET | Refills: 12 | Status: SHIPPED | OUTPATIENT
Start: 2021-03-12 | End: 2021-03-30 | Stop reason: ALTCHOICE

## 2021-03-30 ENCOUNTER — OFFICE VISIT (OUTPATIENT)
Dept: OBSTETRICS AND GYNECOLOGY | Facility: CLINIC | Age: 26
End: 2021-03-30

## 2021-03-30 VITALS
HEIGHT: 60 IN | SYSTOLIC BLOOD PRESSURE: 124 MMHG | BODY MASS INDEX: 37.5 KG/M2 | DIASTOLIC BLOOD PRESSURE: 74 MMHG | WEIGHT: 191 LBS

## 2021-03-30 DIAGNOSIS — Z01.419 ENCOUNTER FOR GYNECOLOGICAL EXAMINATION WITHOUT ABNORMAL FINDING: Primary | ICD-10-CM

## 2021-03-30 PROCEDURE — 99213 OFFICE O/P EST LOW 20 MIN: CPT | Performed by: OBSTETRICS & GYNECOLOGY

## 2021-03-30 RX ORDER — LEVONORGESTREL AND ETHINYL ESTRADIOL 0.15-0.03
1 KIT ORAL DAILY
Qty: 84 TABLET | Refills: 4 | Status: SHIPPED | OUTPATIENT
Start: 2021-03-30 | End: 2022-07-05

## 2021-03-30 NOTE — PROGRESS NOTES
Subjective   Chief Complaint   Patient presents with   • Gynecologic Exam     last pap 2020 WNL, pt concerned about depression while on menses . pt does not need refills , a;adrian sent it , just needs to discuss depression issues      Sadia Whitney is a 25 y.o. year old .  Patient's last menstrual period was 2021.  She presents to be seen because of prementrual and menstrual depression that has been noted for the past 6 months. Has been on Zoloft for 2 years. Definite cyclic pattern.Has been on blisovi for about 3+ years.     OTHER COMPLAINTS:  Nothing else    The following portions of the patient's history were reviewed and updated as appropriate:  She  has a past medical history of Anxiety and Depression.  She does not have any pertinent problems on file.  She  has a past surgical history that includes [place] tooth extraction and Breast Reduction.  Her family history includes Cancer in her maternal grandfather; No Known Problems in her father and mother.  She  reports that she has never smoked. She has never used smokeless tobacco. She reports current alcohol use. She reports that she does not use drugs.  Current Outpatient Medications   Medication Sig Dispense Refill   • Blisovi Fe 1.5/30 1.5-30 MG-MCG tablet TAKE ONE TABLET BY MOUTH DAILY 28 tablet 12   • Crisaborole (EUCRISA) 2 % ointment Apply 1 each topically 2 (Two) Times a Day. 60 g 2   • Loratadine (Claritin) 10 MG capsule Take  by mouth.     • sertraline (ZOLOFT) 100 MG tablet Take 1.5 tablets by mouth Daily for 30 days. 45 tablet 5     No current facility-administered medications for this visit.     Current Outpatient Medications on File Prior to Visit   Medication Sig   • Blisovi Fe 1.5/30 1.5-30 MG-MCG tablet TAKE ONE TABLET BY MOUTH DAILY   • Crisaborole (EUCRISA) 2 % ointment Apply 1 each topically 2 (Two) Times a Day.   • Loratadine (Claritin) 10 MG capsule Take  by mouth.   • sertraline (ZOLOFT) 100 MG tablet Take 1.5 tablets  "by mouth Daily for 30 days.     No current facility-administered medications on file prior to visit.     She has No Known Allergies.    Social History    Tobacco Use      Smoking status: Never Smoker      Smokeless tobacco: Never Used    Review of Systems  Consitutional POS: nothing reported    NEG: anorexia or night sweats   Gastointestinal POS: nothing reported    NEG: bloating, change in bowel habits, melena or reflux symptoms   Genitourinary POS: nothing reported    NEG: dysuria or hematuria   Integument POS: nothing reported    NEG: moles that are changing in size, shape, color or rashes   Breast POS: nothing reported    NEG: persistent breast lump, skin dimpling or nipple discharge         Respiratory: negative  Cardiovascular: negative          Objective   /74   Ht 152.4 cm (60\")   Wt 86.6 kg (191 lb)   LMP 02/23/2021   BMI 37.30 kg/m²     General:  well developed; well nourished  no acute distress   Skin:  No suspicious lesions seen   Thyroid: normal to inspection and palpation   Lungs:  breathing is unlabored   Heart:  Not performed.   Breasts:  Not performed.   Abdomen: soft, non-tender; no masses  no umbilical or inguinal hernias are present  no hepato-splenomegaly   Pelvis: Not performed.     Psychiatric: Alert and oriented ×3, mood and affect appropriate  HEENT: Atraumatic, normocephalic, normal scleral icterus  Extremities: 2+ pulses bilaterally, no edema      Lab Review   No data reviewed    Imaging   No data reviewed        Assessment   1. Depression premenstrually and end with cessation of menses     Plan   1. Discussed changing OCP's  2. Change over to seasonale    No orders of the defined types were placed in this encounter.         This note was electronically signed.      March 30, 2021        "

## 2021-04-20 ENCOUNTER — IMMUNIZATION (OUTPATIENT)
Dept: VACCINE CLINIC | Facility: HOSPITAL | Age: 26
End: 2021-04-20

## 2021-04-20 PROCEDURE — 0001A: CPT | Performed by: INTERNAL MEDICINE

## 2021-04-20 PROCEDURE — 91300 HC SARSCOV02 VAC 30MCG/0.3ML IM: CPT | Performed by: INTERNAL MEDICINE

## 2021-05-11 ENCOUNTER — IMMUNIZATION (OUTPATIENT)
Dept: VACCINE CLINIC | Facility: HOSPITAL | Age: 26
End: 2021-05-11

## 2021-05-11 PROCEDURE — 91300 HC SARSCOV02 VAC 30MCG/0.3ML IM: CPT | Performed by: INTERNAL MEDICINE

## 2021-05-11 PROCEDURE — 0002A: CPT | Performed by: INTERNAL MEDICINE

## 2021-10-10 NOTE — PROGRESS NOTES
Sadia Whitney is a 24 y.o. female.    Chief Complaint   Patient presents with   • Insomnia     Pt states medication is working very well        HPI   Patient presents today to follow-up on insomnia.  She has been switched over to amitriptyline from trazodone due to strange dreams.  She reports that the strange dreams are still present, but not as severe.  She does like amitriptyline and states that it seems to work faster than the trazodone did.  She reports the medication does help her to get to sleep and stay asleep.  She is resting well.    Patient also reports that she does have intermittent asthma that seems to be exacerbated by the cold.  She denies any current shortness of breath in the cold temperature, but does admit that it will likely occur in the near future.  She reports that she does have an albuterol inhaler on hand.  She is not requesting a refill at this time.    The following portions of the patient's history were reviewed and updated as appropriate: allergies, current medications, past family history, past medical history, past social history, past surgical history and problem list.     No Known Allergies      Current Outpatient Medications:   •  amitriptyline (ELAVIL) 25 MG tablet, Take 1 tablet by mouth Every Night., Disp: 30 tablet, Rfl: 2  •  BLISOVI FE 1.5/30 1.5-30 MG-MCG tablet, TAKE ONE TABLET BY MOUTH DAILY, Disp: 28 tablet, Rfl: 0  •  Crisaborole (EUCRISA) 2 % ointment, Apply 1 each topically 2 (Two) Times a Day., Disp: 60 g, Rfl: 2  •  sertraline (ZOLOFT) 100 MG tablet, Take 1 tablet by mouth Daily., Disp: 30 tablet, Rfl: 5    ROS    Review of Systems   Constitutional: Negative for chills and fever.   Respiratory: Negative for shortness of breath.    Cardiovascular: Negative for chest pain.   Gastrointestinal: Negative for abdominal pain, diarrhea, nausea and vomiting.   Psychiatric/Behavioral: Negative for dysphoric mood and sleep disturbance. The patient is not nervous/anxious.   "      Vitals:    11/13/19 1017   BP: 114/70   BP Location: Left arm   Patient Position: Sitting   Cuff Size: Adult   Pulse: 86   Temp: 97.9 °F (36.6 °C)   TempSrc: Temporal   SpO2: 97%   Weight: 70.3 kg (155 lb)   Height: 152.4 cm (60\")     Body mass index is 30.27 kg/m².    Physical Exam     Physical Exam   Constitutional: She is oriented to person, place, and time. She appears well-developed and well-nourished. No distress.   HENT:   Head: Normocephalic and atraumatic.   Right Ear: External ear normal.   Left Ear: External ear normal.   Eyes: Conjunctivae and EOM are normal.   Cardiovascular: Normal rate and regular rhythm.   No murmur heard.  Pulmonary/Chest: Effort normal and breath sounds normal. No respiratory distress. She has no wheezes.   Abdominal: Soft. Bowel sounds are normal. She exhibits no distension. There is no tenderness.   Neurological: She is alert and oriented to person, place, and time. No cranial nerve deficit.   Skin: Skin is warm and dry.   Psychiatric: She has a normal mood and affect. Her behavior is normal.       Assessment/Plan    Problem List Items Addressed This Visit        Respiratory    Mild intermittent asthma without complication - Primary     Stable at this time.  Patient may use albuterol as needed for exacerbation during cold temperature.              Other    Psychophysiological insomnia     Controlled on current medication.  Patient is to continue amitriptyline.               No orders of the defined types were placed in this encounter.      No orders of the defined types were placed in this encounter.      Return in about 3 months (around 2/13/2020) for depression, insomnia, eczema.    Charlotte Antunez DO  " Hyperkalemia

## 2021-11-16 RX ORDER — SERTRALINE HYDROCHLORIDE 100 MG/1
TABLET, FILM COATED ORAL
Qty: 45 TABLET | Refills: 5 | Status: SHIPPED | OUTPATIENT
Start: 2021-11-16 | End: 2022-03-03 | Stop reason: SDUPTHER

## 2022-03-03 ENCOUNTER — OFFICE VISIT (OUTPATIENT)
Dept: INTERNAL MEDICINE | Facility: CLINIC | Age: 27
End: 2022-03-03

## 2022-03-03 VITALS
HEART RATE: 60 BPM | SYSTOLIC BLOOD PRESSURE: 118 MMHG | OXYGEN SATURATION: 98 % | HEIGHT: 60 IN | BODY MASS INDEX: 37.11 KG/M2 | WEIGHT: 189 LBS | DIASTOLIC BLOOD PRESSURE: 78 MMHG | TEMPERATURE: 98.2 F

## 2022-03-03 DIAGNOSIS — F51.04 PSYCHOPHYSIOLOGICAL INSOMNIA: ICD-10-CM

## 2022-03-03 DIAGNOSIS — F33.0 MILD EPISODE OF RECURRENT MAJOR DEPRESSIVE DISORDER: ICD-10-CM

## 2022-03-03 DIAGNOSIS — Z23 NEED FOR COVID-19 VACCINE: ICD-10-CM

## 2022-03-03 DIAGNOSIS — F41.9 ANXIETY: Primary | ICD-10-CM

## 2022-03-03 PROCEDURE — 0054A COVID-19 (PFIZER) 12+ YRS: CPT | Performed by: FAMILY MEDICINE

## 2022-03-03 PROCEDURE — 99214 OFFICE O/P EST MOD 30 MIN: CPT | Performed by: FAMILY MEDICINE

## 2022-03-03 PROCEDURE — 91305 COVID-19 (PFIZER) 12+ YRS: CPT | Performed by: FAMILY MEDICINE

## 2022-03-03 RX ORDER — SERTRALINE HYDROCHLORIDE 100 MG/1
150 TABLET, FILM COATED ORAL DAILY
Qty: 45 TABLET | Refills: 5 | Status: SHIPPED | OUTPATIENT
Start: 2022-03-03 | End: 2022-09-26 | Stop reason: SDUPTHER

## 2022-03-03 RX ORDER — BUPROPION HYDROCHLORIDE 150 MG/1
150 TABLET ORAL DAILY
Qty: 30 TABLET | Refills: 5 | Status: SHIPPED | OUTPATIENT
Start: 2022-03-03 | End: 2022-09-18 | Stop reason: SDUPTHER

## 2022-03-03 NOTE — PROGRESS NOTES
"Sadia Whitney is a 26 y.o. female.    Chief Complaint   Patient presents with   • Mental Health Problem     lower moods.    • Insomnia   • Med Refill       HPI   Patient presents today to follow up on anxiety and depression.  She is taking zoloft without much improvement here latelty.  Patient admits to feeling overwhelmed and worried. Admits to feeling down, hopelessness, worthlessness, sadness. She has trouble sleeping again.  She is taking OTC sleep aid. It is helping her to some extent.      She would like to receive the COVID booster today.    The following portions of the patient's history were reviewed and updated as appropriate: allergies, current medications, past family history, past medical history, past social history, past surgical history and problem list.     No Known Allergies      Current Outpatient Medications:   •  Crisaborole (EUCRISA) 2 % ointment, Apply 1 each topically 2 (Two) Times a Day., Disp: 60 g, Rfl: 2  •  levonorgestrel-ethinyl estradiol (SEASONALE) 0.15-0.03 MG per tablet, Take 1 tablet by mouth Daily., Disp: 84 tablet, Rfl: 4  •  Loratadine (Claritin) 10 MG capsule, Take  by mouth., Disp: , Rfl:   •  sertraline (ZOLOFT) 100 MG tablet, Take 1.5 tablets by mouth Daily., Disp: 45 tablet, Rfl: 5  •  buPROPion XL (Wellbutrin XL) 150 MG 24 hr tablet, Take 1 tablet by mouth Daily., Disp: 30 tablet, Rfl: 5    ROS    Review of Systems   Constitutional: Negative for chills and fever.   Respiratory: Negative for cough and shortness of breath.    Cardiovascular: Negative for chest pain.   Gastrointestinal: Negative for abdominal pain, constipation, diarrhea, nausea and vomiting.   Psychiatric/Behavioral: Positive for sleep disturbance, depressed mood and stress. The patient is nervous/anxious.        Vitals:    03/03/22 1438   BP: 118/78   Pulse: 60   Temp: 98.2 °F (36.8 °C)   SpO2: 98%   Weight: 85.7 kg (189 lb)   Height: 152.4 cm (60\")     Body mass index is 36.91 kg/m².    Physical Exam "     Physical Exam  Constitutional:       General: She is not in acute distress.     Appearance: Normal appearance. She is well-developed.   HENT:      Head: Normocephalic and atraumatic.      Right Ear: External ear normal.      Left Ear: External ear normal.   Eyes:      Extraocular Movements: Extraocular movements intact.      Conjunctiva/sclera: Conjunctivae normal.   Cardiovascular:      Rate and Rhythm: Normal rate and regular rhythm.      Heart sounds: No murmur heard.      Pulmonary:      Effort: Pulmonary effort is normal. No respiratory distress.      Breath sounds: Normal breath sounds. No wheezing.   Abdominal:      General: Bowel sounds are normal. There is no distension.      Palpations: Abdomen is soft.      Tenderness: There is no abdominal tenderness.   Skin:     Coloration: Skin is not pale.   Neurological:      Mental Status: She is alert and oriented to person, place, and time.      Cranial Nerves: No cranial nerve deficit.   Psychiatric:         Mood and Affect: Mood is anxious.         Assessment/Plan    Problems Addressed this Visit        Mental Health    Mild episode of recurrent major depressive disorder (HCC)     Uncontrolled on current medication.  Continue zoloft.  Will start wellbutrin.  Discussed potential side effects of the medication today.           Relevant Medications    buPROPion XL (Wellbutrin XL) 150 MG 24 hr tablet    sertraline (ZOLOFT) 100 MG tablet    Anxiety - Primary     Uncontrolled on current medication.  Continue zoloft.  Will start wellbutrin.  Discussed potential side effects of the medication today.              Sleep    Psychophysiological insomnia     Improved with OTC sleep aid.             Other Visit Diagnoses     Need for COVID-19 vaccine            Advised may receive additional vaccines at little to no cost at local health departments.  Advised [facility] may not be offering vaccines at this time and should be able to receive vaccines at surrounding  Co. Health Departments.  Student services may also be able to provide vaccinations at a lower cost.      New Medications Ordered This Visit   Medications   • buPROPion XL (Wellbutrin XL) 150 MG 24 hr tablet     Sig: Take 1 tablet by mouth Daily.     Dispense:  30 tablet     Refill:  5   • sertraline (ZOLOFT) 100 MG tablet     Sig: Take 1.5 tablets by mouth Daily.     Dispense:  45 tablet     Refill:  5       Orders Placed This Encounter   Procedures   • COVID-19 Vaccine (Pfizer) Gray Cap       Return in about 6 weeks (around 4/14/2022) for anxiety and depression.    Charlotte Antunez, DO

## 2022-03-04 NOTE — ASSESSMENT & PLAN NOTE
Uncontrolled on current medication.  Continue zoloft.  Will start wellbutrin.  Discussed potential side effects of the medication today.

## 2022-04-15 ENCOUNTER — OFFICE VISIT (OUTPATIENT)
Dept: INTERNAL MEDICINE | Facility: CLINIC | Age: 27
End: 2022-04-15

## 2022-04-15 VITALS
WEIGHT: 184 LBS | HEIGHT: 60 IN | SYSTOLIC BLOOD PRESSURE: 130 MMHG | BODY MASS INDEX: 36.12 KG/M2 | TEMPERATURE: 97.3 F | HEART RATE: 102 BPM | DIASTOLIC BLOOD PRESSURE: 80 MMHG | OXYGEN SATURATION: 99 %

## 2022-04-15 DIAGNOSIS — M25.559 HIP PAIN: ICD-10-CM

## 2022-04-15 DIAGNOSIS — F41.9 ANXIETY: Primary | ICD-10-CM

## 2022-04-15 DIAGNOSIS — F33.0 MILD EPISODE OF RECURRENT MAJOR DEPRESSIVE DISORDER: ICD-10-CM

## 2022-04-15 DIAGNOSIS — G56.01 CARPAL TUNNEL SYNDROME OF RIGHT WRIST: ICD-10-CM

## 2022-04-15 PROCEDURE — 99214 OFFICE O/P EST MOD 30 MIN: CPT | Performed by: FAMILY MEDICINE

## 2022-04-15 NOTE — PROGRESS NOTES
Sadia Whitney is a 26 y.o. female.    Chief Complaint   Patient presents with   • Anxiety   • Depression       HPI   Patient presents today to follow-up on anxiety and depression.  Wellbutrin was added at her last office visit.  She is taking Zoloft as well.  She reports doing well with addition of Wellbutrin. She reports normal mount of nervousness with end of school finals.  She denies feelings of hopelessness or worthlessness.     Patient reports right hand gets really tingly when writing notes.  Only affects thumb and 2nd 3rd digits.  She does feel mild pin in the wrist.  She has been using a brace.  Also complains of hip pain.      The following portions of the patient's history were reviewed and updated as appropriate: allergies, current medications, past family history, past medical history, past social history, past surgical history and problem list.     No Known Allergies      Current Outpatient Medications:   •  buPROPion XL (Wellbutrin XL) 150 MG 24 hr tablet, Take 1 tablet by mouth Daily., Disp: 30 tablet, Rfl: 5  •  Crisaborole (EUCRISA) 2 % ointment, Apply 1 each topically 2 (Two) Times a Day., Disp: 60 g, Rfl: 2  •  Loratadine 10 MG capsule, Take  by mouth., Disp: , Rfl:   •  sertraline (ZOLOFT) 100 MG tablet, Take 1.5 tablets by mouth Daily., Disp: 45 tablet, Rfl: 5  •  levonorgestrel-ethinyl estradiol (SEASONALE) 0.15-0.03 MG per tablet, Take 1 tablet by mouth Daily., Disp: 84 tablet, Rfl: 4    ROS    Review of Systems   Constitutional: Negative for chills and fever.   Respiratory: Negative for cough and shortness of breath.    Cardiovascular: Negative for chest pain.   Gastrointestinal: Negative for diarrhea, nausea and vomiting.   Musculoskeletal: Positive for arthralgias and back pain.   Neurological: Positive for numbness.   Psychiatric/Behavioral: Positive for sleep disturbance. Negative for depressed mood. The patient is nervous/anxious.        Vitals:    04/15/22 1310   BP: 130/80   Pulse:  "102   Temp: 97.3 °F (36.3 °C)   SpO2: 99%   Weight: 83.5 kg (184 lb)   Height: 152.4 cm (60\")     Body mass index is 35.94 kg/m².    Physical Exam     Physical Exam  Constitutional:       General: She is not in acute distress.     Appearance: Normal appearance. She is well-developed.   HENT:      Head: Normocephalic and atraumatic.      Right Ear: External ear normal.      Left Ear: External ear normal.   Eyes:      Extraocular Movements: Extraocular movements intact.      Conjunctiva/sclera: Conjunctivae normal.   Cardiovascular:      Rate and Rhythm: Normal rate and regular rhythm.      Heart sounds: No murmur heard.  Pulmonary:      Effort: Pulmonary effort is normal. No respiratory distress.      Breath sounds: Normal breath sounds. No wheezing.   Abdominal:      General: Bowel sounds are normal. There is no distension.      Palpations: Abdomen is soft.      Tenderness: There is no abdominal tenderness.   Musculoskeletal:      Right wrist: No tenderness. Normal range of motion.      Left wrist: No tenderness. Normal range of motion.   Skin:     Coloration: Skin is not pale.   Neurological:      Mental Status: She is alert and oriented to person, place, and time.      Cranial Nerves: No cranial nerve deficit.   Psychiatric:         Mood and Affect: Mood normal.         Behavior: Behavior normal.         Assessment/Plan    Problems Addressed this Visit     Mild episode of recurrent major depressive disorder (HCC)     Controlled on current medication.  Patient will continue Zoloft and Wellbutrin.           Hip pain     Patient encouraged to do stretches and exercises at home.  She is provided with a handout for hip and low back exercises and stretches in the after visit summary.           Anxiety - Primary     Stable on current medication.  Patient will continue Zoloft and Wellbutrin.           Carpal tunnel syndrome of right wrist     Encouraged to continue using a brace.  May take ibuprofen or Aleve as needed for " pain/inflammation.             Diagnoses       Codes Comments    Anxiety    -  Primary ICD-10-CM: F41.9  ICD-9-CM: 300.00     Mild episode of recurrent major depressive disorder (HCC)     ICD-10-CM: F33.0  ICD-9-CM: 296.31     Carpal tunnel syndrome of right wrist     ICD-10-CM: G56.01  ICD-9-CM: 354.0     Hip pain     ICD-10-CM: M25.559  ICD-9-CM: 719.45           No orders of the defined types were placed in this encounter.      No orders of the defined types were placed in this encounter.      Return in about 6 months (around 10/15/2022) for anxiety and depression.    Charlotte Antunez, DO

## 2022-04-24 PROBLEM — M25.559 HIP PAIN: Status: ACTIVE | Noted: 2020-02-17

## 2022-04-24 PROBLEM — G56.01 CARPAL TUNNEL SYNDROME OF RIGHT WRIST: Status: ACTIVE | Noted: 2022-04-24

## 2022-04-25 NOTE — ASSESSMENT & PLAN NOTE
Patient encouraged to do stretches and exercises at home.  She is provided with a handout for hip and low back exercises and stretches in the after visit summary.

## 2022-04-25 NOTE — ASSESSMENT & PLAN NOTE
Encouraged to continue using a brace.  May take ibuprofen or Aleve as needed for pain/inflammation.

## 2022-07-05 RX ORDER — LEVONORGESTREL / ETHINYL ESTRADIOL 0.15-0.03
KIT ORAL
Qty: 91 TABLET | Refills: 0 | Status: SHIPPED | OUTPATIENT
Start: 2022-07-05 | End: 2022-09-12

## 2022-09-12 RX ORDER — LEVONORGESTREL / ETHINYL ESTRADIOL 0.15-0.03
KIT ORAL
Qty: 91 TABLET | Refills: 0 | Status: SHIPPED | OUTPATIENT
Start: 2022-09-12 | End: 2022-09-15 | Stop reason: SDUPTHER

## 2022-09-15 ENCOUNTER — TELEPHONE (OUTPATIENT)
Dept: OBSTETRICS AND GYNECOLOGY | Facility: CLINIC | Age: 27
End: 2022-09-15

## 2022-09-15 RX ORDER — LEVONORGESTREL AND ETHINYL ESTRADIOL 0.15-0.03
1 KIT ORAL DAILY
Qty: 91 TABLET | Refills: 0 | Status: SHIPPED | OUTPATIENT
Start: 2022-09-15 | End: 2023-02-20 | Stop reason: SDUPTHER

## 2022-09-15 NOTE — TELEPHONE ENCOUNTER
----- Message from Danna Euceda MA sent at 9/14/2022  4:28 PM EDT -----  Patient is requesting refills on birth control . Has Annual scheduled in October    Dr Suleiman Rebolledo    Thank You

## 2022-09-19 RX ORDER — BUPROPION HYDROCHLORIDE 150 MG/1
150 TABLET ORAL DAILY
Qty: 30 TABLET | Refills: 5 | Status: SHIPPED | OUTPATIENT
Start: 2022-09-19 | End: 2022-10-24 | Stop reason: SDUPTHER

## 2022-09-19 NOTE — TELEPHONE ENCOUNTER
Rx Refill Note  Requested Prescriptions     Pending Prescriptions Disp Refills   • buPROPion XL (Wellbutrin XL) 150 MG 24 hr tablet 30 tablet 5     Sig: Take 1 tablet by mouth Daily.      Last office visit with prescribing clinician: 4/15/2022      Next office visit with prescribing clinician: 9/26/2022            Jenn Watson MA  09/19/22, 14:35 EDT

## 2022-09-26 ENCOUNTER — OFFICE VISIT (OUTPATIENT)
Dept: INTERNAL MEDICINE | Facility: CLINIC | Age: 27
End: 2022-09-26

## 2022-09-26 VITALS
OXYGEN SATURATION: 99 % | DIASTOLIC BLOOD PRESSURE: 82 MMHG | BODY MASS INDEX: 36.71 KG/M2 | HEART RATE: 128 BPM | HEIGHT: 60 IN | WEIGHT: 187 LBS | TEMPERATURE: 98 F | SYSTOLIC BLOOD PRESSURE: 120 MMHG

## 2022-09-26 DIAGNOSIS — F33.0 MILD EPISODE OF RECURRENT MAJOR DEPRESSIVE DISORDER: ICD-10-CM

## 2022-09-26 DIAGNOSIS — F51.04 PSYCHOPHYSIOLOGICAL INSOMNIA: Primary | ICD-10-CM

## 2022-09-26 DIAGNOSIS — F41.9 ANXIETY: ICD-10-CM

## 2022-09-26 PROCEDURE — 99214 OFFICE O/P EST MOD 30 MIN: CPT | Performed by: FAMILY MEDICINE

## 2022-09-26 RX ORDER — SERTRALINE HYDROCHLORIDE 100 MG/1
200 TABLET, FILM COATED ORAL DAILY
Qty: 90 TABLET | Refills: 3 | Status: SHIPPED | OUTPATIENT
Start: 2022-09-26 | End: 2023-01-24 | Stop reason: SDUPTHER

## 2022-09-26 NOTE — PROGRESS NOTES
"Sadia Whitney is a 27 y.o. female.    Chief Complaint   Patient presents with   • Anxiety   • Depression       HPI   Patient presents today to follow up on anxiety and depression.  Not sleeping well due to increased stress.  Some days doesn't sleep and some days sleeps all day.  She has taken an over-the-counter sleep aid with improvement when needed.  She feels overwhelmed and feels nervous and worried about getting all her classes in.  She recently found out she is still lacking a class to complete her degree.  This is added to her stress level.     The following portions of the patient's history were reviewed and updated as appropriate: allergies, current medications, past family history, past medical history, past social history, past surgical history and problem list.     No Known Allergies      Current Outpatient Medications:   •  buPROPion XL (Wellbutrin XL) 150 MG 24 hr tablet, Take 1 tablet by mouth Daily., Disp: 30 tablet, Rfl: 5  •  Crisaborole (EUCRISA) 2 % ointment, Apply 1 each topically 2 (Two) Times a Day., Disp: 60 g, Rfl: 2  •  levonorgestrel-ethinyl estradiol (Jolessa) 0.15-0.03 MG per tablet, Take 1 tablet by mouth Daily., Disp: 91 tablet, Rfl: 0  •  Loratadine 10 MG capsule, Take  by mouth., Disp: , Rfl:   •  sertraline (ZOLOFT) 100 MG tablet, Take 2 tablets by mouth Daily., Disp: 90 tablet, Rfl: 3    ROS    Review of Systems   Constitutional: Negative for chills and fever.   Respiratory: Negative for cough and shortness of breath.    Cardiovascular: Negative for chest pain.   Gastrointestinal: Negative for constipation, diarrhea, nausea and vomiting.   Psychiatric/Behavioral: Positive for sleep disturbance and stress. The patient is nervous/anxious.        Vitals:    09/26/22 1508   BP: 120/82   Pulse: (!) 128   Temp: 98 °F (36.7 °C)   SpO2: 99%   Weight: 84.8 kg (187 lb)   Height: 152.4 cm (60\")     Body mass index is 36.52 kg/m².    Physical Exam     Physical Exam  Constitutional:       " General: She is not in acute distress.     Appearance: Normal appearance. She is well-developed.   HENT:      Head: Normocephalic and atraumatic.      Right Ear: External ear normal.      Left Ear: External ear normal.   Eyes:      Extraocular Movements: Extraocular movements intact.      Conjunctiva/sclera: Conjunctivae normal.   Cardiovascular:      Rate and Rhythm: Normal rate and regular rhythm.      Heart sounds: No murmur heard.  Pulmonary:      Effort: Pulmonary effort is normal. No respiratory distress.      Breath sounds: Normal breath sounds. No wheezing.   Abdominal:      General: Bowel sounds are normal. There is no distension.      Palpations: Abdomen is soft.      Tenderness: There is no abdominal tenderness.   Skin:     General: Skin is warm and dry.   Neurological:      Mental Status: She is alert and oriented to person, place, and time.      Cranial Nerves: No cranial nerve deficit.   Psychiatric:         Mood and Affect: Mood is anxious.         Behavior: Behavior normal.         Assessment/Plan    Problems Addressed this Visit     Psychophysiological insomnia - Primary     Improved with generic Unisom.  Patient may continue current medication as needed.         Relevant Medications    sertraline (ZOLOFT) 100 MG tablet    Mild episode of recurrent major depressive disorder (HCC)     Uncontrolled on current medication.  Patient will continue Wellbutrin.  Will increase Zoloft to 200 mg daily.         Relevant Medications    sertraline (ZOLOFT) 100 MG tablet    Anxiety     Uncontrolled on current medication.  Patient will continue Wellbutrin.  Will increase Zoloft to 200 mg daily.  Also discussed possibility of taking hydroxyzine or propranolol as needed for quick relief of anxiety.  Currently patient is using marijuana to calm her nerves at times.             New Medications Ordered This Visit   Medications   • sertraline (ZOLOFT) 100 MG tablet     Sig: Take 2 tablets by mouth Daily.     Dispense:   90 tablet     Refill:  3       No orders of the defined types were placed in this encounter.      Return in about 1 month (around 10/26/2022) for anxiety and depression.    Charlotte Antunez, DO

## 2022-09-27 NOTE — ASSESSMENT & PLAN NOTE
Uncontrolled on current medication.  Patient will continue Wellbutrin.  Will increase Zoloft to 200 mg daily.

## 2022-09-27 NOTE — ASSESSMENT & PLAN NOTE
Uncontrolled on current medication.  Patient will continue Wellbutrin.  Will increase Zoloft to 200 mg daily.  Also discussed possibility of taking hydroxyzine or propranolol as needed for quick relief of anxiety.  Currently patient is using marijuana to calm her nerves at times.

## 2022-10-19 ENCOUNTER — OFFICE VISIT (OUTPATIENT)
Dept: OBSTETRICS AND GYNECOLOGY | Facility: CLINIC | Age: 27
End: 2022-10-19

## 2022-10-19 VITALS — BODY MASS INDEX: 37.97 KG/M2 | SYSTOLIC BLOOD PRESSURE: 122 MMHG | WEIGHT: 194.4 LBS | DIASTOLIC BLOOD PRESSURE: 82 MMHG

## 2022-10-19 DIAGNOSIS — Z01.419 ENCOUNTER FOR GYNECOLOGICAL EXAMINATION WITHOUT ABNORMAL FINDING: Primary | ICD-10-CM

## 2022-10-19 PROCEDURE — 99395 PREV VISIT EST AGE 18-39: CPT | Performed by: OBSTETRICS & GYNECOLOGY

## 2022-10-19 NOTE — PROGRESS NOTES
Subjective   Chief Complaint   Patient presents with   • Gynecologic Exam     Yearly exam and pap smear     Sadia Whitney is a 27 y.o. year old .  Patient's last menstrual period was 2022 (approximate).  She presents to be seen because of annual exam.     OTHER COMPLAINTS:  Nothing else    The following portions of the patient's history were reviewed and updated as appropriate:  She  has a past medical history of Anxiety and Depression.  She does not have any pertinent problems on file.  She  has a past surgical history that includes [place] tooth extraction; Breast Reduction; and Breast surgery (2015).  Her family history includes Cancer in her maternal grandfather; No Known Problems in her father and mother.  She  reports that she has never smoked. She has never used smokeless tobacco. She reports current alcohol use. She reports that she does not use drugs.  Current Outpatient Medications   Medication Sig Dispense Refill   • buPROPion XL (Wellbutrin XL) 150 MG 24 hr tablet Take 1 tablet by mouth Daily. 30 tablet 5   • levonorgestrel-ethinyl estradiol (Jolessa) 0.15-0.03 MG per tablet Take 1 tablet by mouth Daily. 91 tablet 0   • Loratadine 10 MG capsule Take  by mouth.     • sertraline (ZOLOFT) 100 MG tablet Take 2 tablets by mouth Daily. 90 tablet 3   • Crisaborole (EUCRISA) 2 % ointment Apply 1 each topically 2 (Two) Times a Day. 60 g 2     No current facility-administered medications for this visit.     Current Outpatient Medications on File Prior to Visit   Medication Sig   • buPROPion XL (Wellbutrin XL) 150 MG 24 hr tablet Take 1 tablet by mouth Daily.   • levonorgestrel-ethinyl estradiol (Jolessa) 0.15-0.03 MG per tablet Take 1 tablet by mouth Daily.   • Loratadine 10 MG capsule Take  by mouth.   • sertraline (ZOLOFT) 100 MG tablet Take 2 tablets by mouth Daily.   • Crisaborole (EUCRISA) 2 % ointment Apply 1 each topically 2 (Two) Times a Day.     No current facility-administered  medications on file prior to visit.     She has No Known Allergies.    Social History    Tobacco Use      Smoking status: Never      Smokeless tobacco: Never    Review of Systems  Consitutional POS: nothing reported    NEG: anorexia or night sweats   Gastointestinal POS: nothing reported    NEG: bloating, change in bowel habits, melena or reflux symptoms   Genitourinary POS: nothing reported    NEG: dysuria or hematuria   Integument POS: nothing reported    NEG: moles that are changing in size, shape, color or rashes   Breast POS: nothing reported    NEG: persistent breast lump, skin dimpling or nipple discharge         Respiratory: negative  Cardiovascular: negative          Objective   /82   Wt 88.2 kg (194 lb 6.4 oz)   LMP 09/28/2022 (Approximate)   BMI 37.97 kg/m²     General:  well developed; well nourished  no acute distress   Skin:  No suspicious lesions seen   Thyroid: normal to inspection and palpation   Lungs:  breathing is unlabored  clear to auscultation bilaterally   Heart:  regular rate and rhythm, S1, S2 normal, no murmur, click, rub or gallop   Breasts:  Examined in supine position  Symmetric without masses or skin dimpling  Nipples normal without inversion, lesions or discharge  There are no palpable axillary nodes   Abdomen: soft, non-tender; no masses  no umbilical or inguinal hernias are present  no hepato-splenomegaly   Pelvis: Clinical staff was present for exam  External genitalia:  normal appearance of the external genitalia including Bartholin's and [place]'s glands.  :  urethral meatus normal;  Vaginal:  normal pink mucosa without prolapse or lesions.  Cervix:  normal appearance.  Uterus:  normal size, shape and consistency.  Adnexa:  normal bimanual exam of the adnexa.  Rectal:  digital rectal exam not performed; anus visually normal appearing.     Psychiatric: Alert and oriented ×3, mood and affect appropriate  HEENT: Atraumatic, normocephalic, normal scleral  icterus  Extremities: 2+ pulses bilaterally, no edema      Lab Review   No data reviewed    Imaging   No data reviewed        Assessment   1. Normal PE     Plan   1. PAP done  2. Jolessa OCP  3. Diet/exercise    No orders of the defined types were placed in this encounter.         This note was electronically signed.      October 19, 2022

## 2022-10-24 ENCOUNTER — OFFICE VISIT (OUTPATIENT)
Dept: INTERNAL MEDICINE | Facility: CLINIC | Age: 27
End: 2022-10-24

## 2022-10-24 VITALS
TEMPERATURE: 98 F | WEIGHT: 192 LBS | DIASTOLIC BLOOD PRESSURE: 82 MMHG | SYSTOLIC BLOOD PRESSURE: 122 MMHG | HEART RATE: 119 BPM | BODY MASS INDEX: 37.69 KG/M2 | HEIGHT: 60 IN | OXYGEN SATURATION: 98 %

## 2022-10-24 DIAGNOSIS — F51.04 PSYCHOPHYSIOLOGICAL INSOMNIA: ICD-10-CM

## 2022-10-24 DIAGNOSIS — G56.03 BILATERAL CARPAL TUNNEL SYNDROME: ICD-10-CM

## 2022-10-24 DIAGNOSIS — F33.0 MILD EPISODE OF RECURRENT MAJOR DEPRESSIVE DISORDER: Primary | ICD-10-CM

## 2022-10-24 DIAGNOSIS — F41.9 ANXIETY: ICD-10-CM

## 2022-10-24 LAB — REF LAB TEST METHOD: NORMAL

## 2022-10-24 PROCEDURE — 99214 OFFICE O/P EST MOD 30 MIN: CPT | Performed by: FAMILY MEDICINE

## 2022-10-24 RX ORDER — METHYLPREDNISOLONE 4 MG/1
TABLET ORAL
Qty: 21 EACH | Refills: 0 | Status: SHIPPED | OUTPATIENT
Start: 2022-10-24 | End: 2023-01-24

## 2022-10-24 RX ORDER — BUPROPION HYDROCHLORIDE 300 MG/1
300 TABLET ORAL DAILY
Qty: 30 TABLET | Refills: 5 | Status: SHIPPED | OUTPATIENT
Start: 2022-10-24 | End: 2023-01-24 | Stop reason: SDUPTHER

## 2022-10-24 NOTE — PROGRESS NOTES
Sadia Whitney is a 27 y.o. female.    Chief Complaint   Patient presents with   • Anxiety   • Depression     Feels more depressed then before upping the medication.        HPI   Patient presents today for follow up on anxiety and depression. Patient reports increased depression.  Anxiety somewhat improved.  Dealing with stress better.  Admits to anhedonia.  Trouble getting to sleep.  Denies trouble staying asleep.  Admits to feelings of hopelessness and worthlessness.  Still taking OTC sleep agents with improvement.     Patient continues to have wrist pain with numbness and tingling into hands.  She is taking ibuprofen prn for wrist pain.  She does work on her computer a lot and plays video games requiring use of thumbs.     The following portions of the patient's history were reviewed and updated as appropriate: allergies, current medications, past family history, past medical history, past social history, past surgical history and problem list.     No Known Allergies      Current Outpatient Medications:   •  buPROPion XL (Wellbutrin XL) 300 MG 24 hr tablet, Take 1 tablet by mouth Daily., Disp: 30 tablet, Rfl: 5  •  Crisaborole (EUCRISA) 2 % ointment, Apply 1 each topically 2 (Two) Times a Day., Disp: 60 g, Rfl: 2  •  levonorgestrel-ethinyl estradiol (Jolessa) 0.15-0.03 MG per tablet, Take 1 tablet by mouth Daily., Disp: 91 tablet, Rfl: 0  •  Loratadine 10 MG capsule, Take  by mouth., Disp: , Rfl:   •  sertraline (ZOLOFT) 100 MG tablet, Take 2 tablets by mouth Daily. (Patient taking differently: Take 1 tablet by mouth Daily.), Disp: 90 tablet, Rfl: 3  •  methylPREDNISolone (MEDROL) 4 MG dose pack, Take as directed on package instructions., Disp: 21 each, Rfl: 0    ROS    Review of Systems   Constitutional: Positive for fatigue. Negative for chills and fever.   Respiratory: Positive for shortness of breath. Negative for cough.    Cardiovascular: Negative for chest pain.   Gastrointestinal: Negative for  "constipation, diarrhea, nausea and vomiting.   Psychiatric/Behavioral: Positive for sleep disturbance, depressed mood and stress (improved). The patient is nervous/anxious.        Vitals:    10/24/22 1512   BP: 122/82   Pulse: 119   Temp: 98 °F (36.7 °C)   SpO2: 98%   Weight: 87.1 kg (192 lb)   Height: 152.4 cm (60\")     Body mass index is 37.5 kg/m².    Physical Exam     Physical Exam  Constitutional:       General: She is not in acute distress.     Appearance: Normal appearance. She is well-developed.   HENT:      Head: Normocephalic and atraumatic.      Right Ear: External ear normal.      Left Ear: External ear normal.   Eyes:      Extraocular Movements: Extraocular movements intact.      Conjunctiva/sclera: Conjunctivae normal.   Cardiovascular:      Rate and Rhythm: Normal rate and regular rhythm.      Heart sounds: No murmur heard.  Pulmonary:      Effort: Pulmonary effort is normal. No respiratory distress.      Breath sounds: Normal breath sounds. No wheezing.   Abdominal:      General: Bowel sounds are normal. There is no distension.      Palpations: Abdomen is soft.      Tenderness: There is no abdominal tenderness.   Musculoskeletal:      Comments: Positive tinel's R>L   Skin:     General: Skin is warm and dry.   Neurological:      Mental Status: She is alert and oriented to person, place, and time.      Cranial Nerves: No cranial nerve deficit.   Psychiatric:         Mood and Affect: Mood is anxious.         Behavior: Behavior normal.         Assessment/Plan    Problems Addressed this Visit     Psychophysiological insomnia     Improved with OTC sleep agents.  Patient may continue current medication as needed.         Relevant Medications    buPROPion XL (Wellbutrin XL) 300 MG 24 hr tablet    Mild episode of recurrent major depressive disorder (HCC) - Primary     Uncontrolled.  Decreased zoloft back to 100mg daily.  Will increase wellbutrin to 300mg daily.          Relevant Medications    buPROPion XL " (Wellbutrin XL) 300 MG 24 hr tablet    Anxiety     Uncontrolled.  Decreased zoloft back to 100mg daily.  Will increase wellbutrin to 300mg daily.          Bilateral carpal tunnel syndrome     Encouraged use of braces, especially when playing video games or typing on the computer.  Will treat with a short course of steroids.  May take NSAIDs for pain and inflammation.  If pain persists, would recommend seeing orthopedics.             New Medications Ordered This Visit   Medications   • buPROPion XL (Wellbutrin XL) 300 MG 24 hr tablet     Sig: Take 1 tablet by mouth Daily.     Dispense:  30 tablet     Refill:  5   • methylPREDNISolone (MEDROL) 4 MG dose pack     Sig: Take as directed on package instructions.     Dispense:  21 each     Refill:  0       No orders of the defined types were placed in this encounter.      Return in about 3 months (around 1/24/2023) for anxiety, depression.    Charlotte Antunez,

## 2022-10-29 PROBLEM — G56.03 BILATERAL CARPAL TUNNEL SYNDROME: Status: ACTIVE | Noted: 2022-10-29

## 2022-10-29 NOTE — ASSESSMENT & PLAN NOTE
Encouraged use of braces, especially when playing video games or typing on the computer.  Will treat with a short course of steroids.  May take NSAIDs for pain and inflammation.  If pain persists, would recommend seeing orthopedics.

## 2022-12-15 RX ORDER — LEVONORGESTREL / ETHINYL ESTRADIOL 0.15-0.03
KIT ORAL
Qty: 91 TABLET | Refills: 0 | OUTPATIENT
Start: 2022-12-15

## 2023-01-24 ENCOUNTER — OFFICE VISIT (OUTPATIENT)
Dept: INTERNAL MEDICINE | Facility: CLINIC | Age: 28
End: 2023-01-24
Payer: MEDICAID

## 2023-01-24 VITALS
OXYGEN SATURATION: 98 % | WEIGHT: 188 LBS | HEART RATE: 118 BPM | SYSTOLIC BLOOD PRESSURE: 120 MMHG | DIASTOLIC BLOOD PRESSURE: 80 MMHG | HEIGHT: 60 IN | TEMPERATURE: 98 F | BODY MASS INDEX: 36.91 KG/M2

## 2023-01-24 DIAGNOSIS — F41.9 ANXIETY: ICD-10-CM

## 2023-01-24 DIAGNOSIS — M54.50 LOW BACK PAIN, UNSPECIFIED BACK PAIN LATERALITY, UNSPECIFIED CHRONICITY, UNSPECIFIED WHETHER SCIATICA PRESENT: ICD-10-CM

## 2023-01-24 DIAGNOSIS — F33.0 MILD EPISODE OF RECURRENT MAJOR DEPRESSIVE DISORDER: Primary | ICD-10-CM

## 2023-01-24 DIAGNOSIS — G56.03 BILATERAL CARPAL TUNNEL SYNDROME: ICD-10-CM

## 2023-01-24 DIAGNOSIS — F51.04 PSYCHOPHYSIOLOGICAL INSOMNIA: ICD-10-CM

## 2023-01-24 DIAGNOSIS — R10.31 ABDOMINAL PAIN, RLQ: ICD-10-CM

## 2023-01-24 PROCEDURE — 99214 OFFICE O/P EST MOD 30 MIN: CPT | Performed by: FAMILY MEDICINE

## 2023-01-24 RX ORDER — BUPROPION HYDROCHLORIDE 300 MG/1
300 TABLET ORAL DAILY
Qty: 90 TABLET | Refills: 3 | Status: SHIPPED | OUTPATIENT
Start: 2023-01-24

## 2023-01-24 RX ORDER — SERTRALINE HYDROCHLORIDE 100 MG/1
200 TABLET, FILM COATED ORAL DAILY
Qty: 90 TABLET | Refills: 3 | Status: SHIPPED | OUTPATIENT
Start: 2023-01-24 | End: 2023-01-24 | Stop reason: SDUPTHER

## 2023-01-24 RX ORDER — BUPROPION HYDROCHLORIDE 300 MG/1
300 TABLET ORAL DAILY
Qty: 30 TABLET | Refills: 5 | Status: SHIPPED | OUTPATIENT
Start: 2023-01-24 | End: 2023-01-24 | Stop reason: SDUPTHER

## 2023-01-24 RX ORDER — SERTRALINE HYDROCHLORIDE 100 MG/1
200 TABLET, FILM COATED ORAL DAILY
Qty: 180 TABLET | Refills: 3 | Status: SHIPPED | OUTPATIENT
Start: 2023-01-24

## 2023-01-24 NOTE — PROGRESS NOTES
Sadia Whitney is a 27 y.o. female.    Chief Complaint   Patient presents with   • Anxiety   • Depression       HPI   Patient is here to follow up on depression.  She is doing well on wellbutrin and zoloft.  Denies much nervousness or worry.  She denies feeling of sadness, hopelessness or worthlessness.  She is taking OTC sleep aids with good response.      Admits to low back pain with trace scoliosis.  She is taking tylenol and stretching.  Tried heat and ice.      She has a sharp pain to RLQ off and on.  May last 20 minutes.  She has had an ovarian cyst in the past and this feels similar.    In addition, she continues to have bilateral wrist pain R>L.  Admits to numbness and tingling into the hand.  She has tried NSAIDs and wrist braces.      The following portions of the patient's history were reviewed and updated as appropriate: allergies, current medications, past family history, past medical history, past social history, past surgical history and problem list.     No Known Allergies      Current Outpatient Medications:   •  buPROPion XL (Wellbutrin XL) 300 MG 24 hr tablet, Take 1 tablet by mouth Daily., Disp: 90 tablet, Rfl: 3  •  Crisaborole (EUCRISA) 2 % ointment, Apply 1 each topically 2 (Two) Times a Day., Disp: 60 g, Rfl: 2  •  levonorgestrel-ethinyl estradiol (Jolessa) 0.15-0.03 MG per tablet, Take 1 tablet by mouth Daily., Disp: 91 tablet, Rfl: 0  •  Loratadine 10 MG capsule, Take  by mouth., Disp: , Rfl:   •  sertraline (ZOLOFT) 100 MG tablet, Take 2 tablets by mouth Daily., Disp: 180 tablet, Rfl: 3    ROS    Review of Systems   Constitutional: Negative for chills and fever.   Respiratory: Negative for cough and shortness of breath.    Cardiovascular: Negative for chest pain.   Gastrointestinal: Negative for diarrhea, nausea and vomiting.   Musculoskeletal: Positive for arthralgias and back pain.   Neurological: Positive for numbness.   Psychiatric/Behavioral: Positive for sleep disturbance. Negative  "for depressed mood. The patient is not nervous/anxious.        Vitals:    01/24/23 1506   BP: 120/80   BP Location: Left arm   Patient Position: Sitting   Cuff Size: Adult   Pulse: 118   Temp: 98 °F (36.7 °C)   SpO2: 98%   Weight: 85.3 kg (188 lb)   Height: 152.4 cm (60\")   PainSc: 0-No pain     Body mass index is 36.72 kg/m².    Physical Exam     Physical Exam  Constitutional:       General: She is not in acute distress.     Appearance: Normal appearance. She is well-developed.   HENT:      Head: Normocephalic and atraumatic.      Right Ear: External ear normal.      Left Ear: External ear normal.   Eyes:      Extraocular Movements: Extraocular movements intact.      Conjunctiva/sclera: Conjunctivae normal.   Cardiovascular:      Rate and Rhythm: Normal rate and regular rhythm.      Heart sounds: No murmur heard.  Pulmonary:      Effort: Pulmonary effort is normal. No respiratory distress.      Breath sounds: Normal breath sounds. No wheezing.   Abdominal:      General: Bowel sounds are normal. There is no distension.      Palpations: Abdomen is soft.      Tenderness: There is no abdominal tenderness.   Musculoskeletal:         General: No deformity.   Skin:     General: Skin is warm and dry.   Neurological:      Mental Status: She is alert and oriented to person, place, and time.      Cranial Nerves: No cranial nerve deficit.   Psychiatric:         Mood and Affect: Mood normal.         Behavior: Behavior normal.         Assessment/Plan    Diagnoses and all orders for this visit:    1. Mild episode of recurrent major depressive disorder (HCC) (Primary)  Assessment & Plan:  Controlled on current medication.  Continue zoloft 100mg daily and wellbutrin 300mg daily.        2. Anxiety  Assessment & Plan:  Controlled on current medication.  Continue zoloft 100mg daily and wellbutrin 300mg daily.        3. Psychophysiological insomnia  Assessment & Plan:  Improved with OTC sleep agents.  Patient may continue current " medication as needed.      4. Bilateral carpal tunnel syndrome  Assessment & Plan:  Uncontrolled despite conservative treatment with braces and NSAIDs.  Will refer to hand surgery for further evaluation and treatment.     Orders:  -     Ambulatory Referral to Hand Surgery    5. Low back pain, unspecified back pain laterality, unspecified chronicity, unspecified whether sciatica present  Assessment & Plan:  Continue stretches and conservative treatment. Handout provided for stretches and exercises to do at home.       6. Abdominal pain, RLQ  Assessment & Plan:  Most likely secondary to an ovarian cyst.  Pain should resolve on its own.  If pain persists, notify the office.       Other orders  -     Discontinue: buPROPion XL (Wellbutrin XL) 300 MG 24 hr tablet; Take 1 tablet by mouth Daily.  Dispense: 30 tablet; Refill: 5  -     Discontinue: sertraline (ZOLOFT) 100 MG tablet; Take 2 tablets by mouth Daily.  Dispense: 90 tablet; Refill: 3  -     sertraline (ZOLOFT) 100 MG tablet; Take 2 tablets by mouth Daily.  Dispense: 180 tablet; Refill: 3  -     buPROPion XL (Wellbutrin XL) 300 MG 24 hr tablet; Take 1 tablet by mouth Daily.  Dispense: 90 tablet; Refill: 3      New Medications Ordered This Visit   Medications   • sertraline (ZOLOFT) 100 MG tablet     Sig: Take 2 tablets by mouth Daily.     Dispense:  180 tablet     Refill:  3   • buPROPion XL (Wellbutrin XL) 300 MG 24 hr tablet     Sig: Take 1 tablet by mouth Daily.     Dispense:  90 tablet     Refill:  3       No orders of the defined types were placed in this encounter.      Return in about 6 months (around 7/24/2023) for anxiety and depresion.    Charlotte Antunez DO

## 2023-01-24 NOTE — ASSESSMENT & PLAN NOTE
Uncontrolled despite conservative treatment with braces and NSAIDs.  Will refer to hand surgery for further evaluation and treatment.

## 2023-01-29 PROBLEM — R10.31 ABDOMINAL PAIN, RLQ: Status: ACTIVE | Noted: 2023-01-29

## 2023-01-29 PROBLEM — M54.50 LOW BACK PAIN: Status: ACTIVE | Noted: 2023-01-29

## 2023-01-30 NOTE — ASSESSMENT & PLAN NOTE
Most likely secondary to an ovarian cyst.  Pain should resolve on its own.  If pain persists, notify the office.

## 2023-01-30 NOTE — ASSESSMENT & PLAN NOTE
Continue stretches and conservative treatment. Handout provided for stretches and exercises to do at home.

## 2023-02-21 RX ORDER — LEVONORGESTREL AND ETHINYL ESTRADIOL 0.15-0.03
1 KIT ORAL DAILY
Qty: 91 TABLET | Refills: 0 | Status: SHIPPED | OUTPATIENT
Start: 2023-02-21

## 2023-05-22 RX ORDER — LEVONORGESTREL / ETHINYL ESTRADIOL 0.15-0.03
KIT ORAL
Qty: 91 TABLET | Refills: 1 | Status: SHIPPED | OUTPATIENT
Start: 2023-05-22

## 2023-12-26 RX ORDER — LEVONORGESTREL / ETHINYL ESTRADIOL 0.15-0.03
1 KIT ORAL DAILY
Qty: 182 TABLET | Refills: 0 | OUTPATIENT
Start: 2023-12-26

## 2024-04-09 RX ORDER — BUPROPION HYDROCHLORIDE 300 MG/1
300 TABLET ORAL DAILY
Qty: 90 TABLET | Refills: 0 | OUTPATIENT
Start: 2024-04-09